# Patient Record
Sex: FEMALE | Race: WHITE | NOT HISPANIC OR LATINO | Employment: FULL TIME | URBAN - METROPOLITAN AREA
[De-identification: names, ages, dates, MRNs, and addresses within clinical notes are randomized per-mention and may not be internally consistent; named-entity substitution may affect disease eponyms.]

---

## 2019-04-24 ENCOUNTER — APPOINTMENT (EMERGENCY)
Dept: CT IMAGING | Facility: HOSPITAL | Age: 59
DRG: 440 | End: 2019-04-24
Payer: COMMERCIAL

## 2019-04-24 ENCOUNTER — HOSPITAL ENCOUNTER (INPATIENT)
Facility: HOSPITAL | Age: 59
LOS: 2 days | Discharge: HOME | DRG: 440 | End: 2019-04-26
Attending: EMERGENCY MEDICINE | Admitting: INTERNAL MEDICINE
Payer: COMMERCIAL

## 2019-04-24 DIAGNOSIS — K85.90 PANCREATITIS: Primary | ICD-10-CM

## 2019-04-24 PROBLEM — I10 ESSENTIAL HYPERTENSION: Status: ACTIVE | Noted: 2019-04-24

## 2019-04-24 LAB
ALBUMIN SERPL BCP-MCNC: 3.9 G/DL (ref 3.5–5)
ALP SERPL-CCNC: 67 U/L (ref 46–116)
ALT SERPL W P-5'-P-CCNC: 17 U/L (ref 12–78)
ANION GAP SERPL CALCULATED.3IONS-SCNC: 10 MMOL/L (ref 4–13)
AST SERPL W P-5'-P-CCNC: 20 U/L (ref 5–45)
BACTERIA UR QL AUTO: ABNORMAL /HPF
BASOPHILS # BLD AUTO: 0.03 THOUSANDS/ΜL (ref 0–0.1)
BASOPHILS NFR BLD AUTO: 1 % (ref 0–1)
BILIRUB SERPL-MCNC: 0.5 MG/DL (ref 0.2–1)
BILIRUB UR QL STRIP: NEGATIVE
BUN SERPL-MCNC: 13 MG/DL (ref 5–25)
CALCIUM SERPL-MCNC: 8.9 MG/DL (ref 8.3–10.1)
CHLORIDE SERPL-SCNC: 103 MMOL/L (ref 100–108)
CLARITY UR: CLEAR
CO2 SERPL-SCNC: 26 MMOL/L (ref 21–32)
COLOR UR: YELLOW
CREAT SERPL-MCNC: 0.73 MG/DL (ref 0.6–1.3)
EOSINOPHIL # BLD AUTO: 0.07 THOUSAND/ΜL (ref 0–0.61)
EOSINOPHIL NFR BLD AUTO: 2 % (ref 0–6)
ERYTHROCYTE [DISTWIDTH] IN BLOOD BY AUTOMATED COUNT: 12.8 % (ref 11.6–15.1)
GFR SERPL CREATININE-BSD FRML MDRD: 90 ML/MIN/1.73SQ M
GLUCOSE SERPL-MCNC: 87 MG/DL (ref 65–140)
GLUCOSE UR STRIP-MCNC: NEGATIVE MG/DL
HCT VFR BLD AUTO: 42.7 % (ref 34.8–46.1)
HGB BLD-MCNC: 14.1 G/DL (ref 11.5–15.4)
HGB UR QL STRIP.AUTO: ABNORMAL
IMM GRANULOCYTES # BLD AUTO: 0 THOUSAND/UL (ref 0–0.2)
IMM GRANULOCYTES NFR BLD AUTO: 0 % (ref 0–2)
KETONES UR STRIP-MCNC: NEGATIVE MG/DL
LEUKOCYTE ESTERASE UR QL STRIP: NEGATIVE
LIPASE SERPL-CCNC: 804 U/L (ref 73–393)
LYMPHOCYTES # BLD AUTO: 1.27 THOUSANDS/ΜL (ref 0.6–4.47)
LYMPHOCYTES NFR BLD AUTO: 27 % (ref 14–44)
MCH RBC QN AUTO: 29.3 PG (ref 26.8–34.3)
MCHC RBC AUTO-ENTMCNC: 33 G/DL (ref 31.4–37.4)
MCV RBC AUTO: 89 FL (ref 82–98)
MONOCYTES # BLD AUTO: 0.46 THOUSAND/ΜL (ref 0.17–1.22)
MONOCYTES NFR BLD AUTO: 10 % (ref 4–12)
NEUTROPHILS # BLD AUTO: 2.84 THOUSANDS/ΜL (ref 1.85–7.62)
NEUTS SEG NFR BLD AUTO: 60 % (ref 43–75)
NITRITE UR QL STRIP: NEGATIVE
NON-SQ EPI CELLS URNS QL MICRO: ABNORMAL /HPF
NRBC BLD AUTO-RTO: 0 /100 WBCS
PH UR STRIP.AUTO: 7 [PH] (ref 4.5–8)
PLATELET # BLD AUTO: 187 THOUSANDS/UL (ref 149–390)
PMV BLD AUTO: 9.4 FL (ref 8.9–12.7)
POTASSIUM SERPL-SCNC: 4.1 MMOL/L (ref 3.5–5.3)
PROT SERPL-MCNC: 7.4 G/DL (ref 6.4–8.2)
PROT UR STRIP-MCNC: NEGATIVE MG/DL
RBC # BLD AUTO: 4.81 MILLION/UL (ref 3.81–5.12)
RBC #/AREA URNS AUTO: ABNORMAL /HPF
SODIUM SERPL-SCNC: 139 MMOL/L (ref 136–145)
SP GR UR STRIP.AUTO: 1.02 (ref 1–1.03)
UROBILINOGEN UR QL STRIP.AUTO: 0.2 E.U./DL
WBC # BLD AUTO: 4.67 THOUSAND/UL (ref 4.31–10.16)
WBC #/AREA URNS AUTO: ABNORMAL /HPF

## 2019-04-24 PROCEDURE — 85025 COMPLETE CBC W/AUTO DIFF WBC: CPT | Performed by: EMERGENCY MEDICINE

## 2019-04-24 PROCEDURE — 83690 ASSAY OF LIPASE: CPT | Performed by: EMERGENCY MEDICINE

## 2019-04-24 PROCEDURE — 96374 THER/PROPH/DIAG INJ IV PUSH: CPT

## 2019-04-24 PROCEDURE — 96375 TX/PRO/DX INJ NEW DRUG ADDON: CPT

## 2019-04-24 PROCEDURE — 36415 COLL VENOUS BLD VENIPUNCTURE: CPT | Performed by: EMERGENCY MEDICINE

## 2019-04-24 PROCEDURE — 99285 EMERGENCY DEPT VISIT HI MDM: CPT

## 2019-04-24 PROCEDURE — 81001 URINALYSIS AUTO W/SCOPE: CPT

## 2019-04-24 PROCEDURE — 99284 EMERGENCY DEPT VISIT MOD MDM: CPT | Performed by: EMERGENCY MEDICINE

## 2019-04-24 PROCEDURE — 99223 1ST HOSP IP/OBS HIGH 75: CPT | Performed by: INTERNAL MEDICINE

## 2019-04-24 PROCEDURE — 74177 CT ABD & PELVIS W/CONTRAST: CPT

## 2019-04-24 PROCEDURE — 80053 COMPREHEN METABOLIC PANEL: CPT | Performed by: EMERGENCY MEDICINE

## 2019-04-24 RX ORDER — ASPIRIN 81 MG/1
81 TABLET ORAL DAILY
COMMUNITY

## 2019-04-24 RX ORDER — ASPIRIN 81 MG/1
81 TABLET ORAL DAILY
Status: DISCONTINUED | OUTPATIENT
Start: 2019-04-24 | End: 2019-04-24

## 2019-04-24 RX ORDER — HYDRALAZINE HYDROCHLORIDE 20 MG/ML
10 INJECTION INTRAMUSCULAR; INTRAVENOUS EVERY 6 HOURS PRN
Status: DISCONTINUED | OUTPATIENT
Start: 2019-04-24 | End: 2019-04-26 | Stop reason: HOSPADM

## 2019-04-24 RX ORDER — HYDROMORPHONE HCL/PF 1 MG/ML
0.5 SYRINGE (ML) INJECTION EVERY 4 HOURS PRN
Status: DISCONTINUED | OUTPATIENT
Start: 2019-04-24 | End: 2019-04-26 | Stop reason: HOSPADM

## 2019-04-24 RX ORDER — FENTANYL CITRATE 50 UG/ML
50 INJECTION, SOLUTION INTRAMUSCULAR; INTRAVENOUS ONCE
Status: COMPLETED | OUTPATIENT
Start: 2019-04-24 | End: 2019-04-24

## 2019-04-24 RX ORDER — SODIUM CHLORIDE 9 MG/ML
125 INJECTION, SOLUTION INTRAVENOUS CONTINUOUS
Status: CANCELLED | OUTPATIENT
Start: 2019-04-24

## 2019-04-24 RX ORDER — ONDANSETRON 2 MG/ML
4 INJECTION INTRAMUSCULAR; INTRAVENOUS EVERY 4 HOURS PRN
Status: DISCONTINUED | OUTPATIENT
Start: 2019-04-24 | End: 2019-04-26 | Stop reason: HOSPADM

## 2019-04-24 RX ORDER — AMOXICILLIN 250 MG
2 CAPSULE ORAL DAILY
Status: DISCONTINUED | OUTPATIENT
Start: 2019-04-24 | End: 2019-04-26 | Stop reason: HOSPADM

## 2019-04-24 RX ORDER — KETOROLAC TROMETHAMINE 30 MG/ML
15 INJECTION, SOLUTION INTRAMUSCULAR; INTRAVENOUS ONCE
Status: COMPLETED | OUTPATIENT
Start: 2019-04-24 | End: 2019-04-24

## 2019-04-24 RX ORDER — ASPIRIN 81 MG/1
81 TABLET ORAL DAILY
Status: DISCONTINUED | OUTPATIENT
Start: 2019-04-24 | End: 2019-04-26 | Stop reason: HOSPADM

## 2019-04-24 RX ORDER — METOPROLOL SUCCINATE 50 MG/1
50 TABLET, EXTENDED RELEASE ORAL DAILY
Status: DISCONTINUED | OUTPATIENT
Start: 2019-04-24 | End: 2019-04-26 | Stop reason: HOSPADM

## 2019-04-24 RX ORDER — ONDANSETRON 2 MG/ML
4 INJECTION INTRAMUSCULAR; INTRAVENOUS ONCE
Status: COMPLETED | OUTPATIENT
Start: 2019-04-24 | End: 2019-04-24

## 2019-04-24 RX ORDER — ACETAMINOPHEN 325 MG/1
650 TABLET ORAL EVERY 4 HOURS PRN
Status: DISCONTINUED | OUTPATIENT
Start: 2019-04-24 | End: 2019-04-24

## 2019-04-24 RX ORDER — METOPROLOL SUCCINATE 50 MG/1
50 TABLET, EXTENDED RELEASE ORAL DAILY
COMMUNITY

## 2019-04-24 RX ORDER — SODIUM CHLORIDE 9 MG/ML
100 INJECTION, SOLUTION INTRAVENOUS CONTINUOUS
Status: DISCONTINUED | OUTPATIENT
Start: 2019-04-24 | End: 2019-04-26 | Stop reason: HOSPADM

## 2019-04-24 RX ORDER — ACETAMINOPHEN 325 MG/1
650 TABLET ORAL EVERY 6 HOURS PRN
Status: DISCONTINUED | OUTPATIENT
Start: 2019-04-24 | End: 2019-04-26 | Stop reason: HOSPADM

## 2019-04-24 RX ORDER — OXYCODONE HYDROCHLORIDE 10 MG/1
10 TABLET ORAL EVERY 4 HOURS PRN
Status: DISCONTINUED | OUTPATIENT
Start: 2019-04-24 | End: 2019-04-26 | Stop reason: HOSPADM

## 2019-04-24 RX ORDER — OXYCODONE HYDROCHLORIDE 5 MG/1
5 TABLET ORAL EVERY 4 HOURS PRN
Status: DISCONTINUED | OUTPATIENT
Start: 2019-04-24 | End: 2019-04-26 | Stop reason: HOSPADM

## 2019-04-24 RX ADMIN — ASPIRIN 81 MG: 81 TABLET, COATED ORAL at 13:20

## 2019-04-24 RX ADMIN — KETOROLAC TROMETHAMINE 15 MG: 30 INJECTION, SOLUTION INTRAMUSCULAR at 07:30

## 2019-04-24 RX ADMIN — IOHEXOL 100 ML: 350 INJECTION, SOLUTION INTRAVENOUS at 08:22

## 2019-04-24 RX ADMIN — ACETAMINOPHEN 650 MG: 325 TABLET, FILM COATED ORAL at 15:54

## 2019-04-24 RX ADMIN — ONDANSETRON 4 MG: 2 INJECTION INTRAMUSCULAR; INTRAVENOUS at 07:31

## 2019-04-24 RX ADMIN — SENNOSIDES AND DOCUSATE SODIUM 2 TABLET: 8.6; 5 TABLET ORAL at 20:18

## 2019-04-24 RX ADMIN — SODIUM CHLORIDE 150 ML/HR: 0.9 INJECTION, SOLUTION INTRAVENOUS at 13:06

## 2019-04-24 RX ADMIN — SODIUM CHLORIDE 150 ML/HR: 0.9 INJECTION, SOLUTION INTRAVENOUS at 20:19

## 2019-04-24 RX ADMIN — METOPROLOL SUCCINATE 50 MG: 50 TABLET, EXTENDED RELEASE ORAL at 13:20

## 2019-04-24 RX ADMIN — FENTANYL CITRATE 50 MCG: 50 INJECTION, SOLUTION INTRAMUSCULAR; INTRAVENOUS at 09:44

## 2019-04-25 LAB
CHOLEST SERPL-MCNC: 121 MG/DL (ref 50–200)
HDLC SERPL-MCNC: 48 MG/DL (ref 40–60)
LDLC SERPL CALC-MCNC: 57 MG/DL (ref 0–100)
LIPASE SERPL-CCNC: 159 U/L (ref 73–393)
NONHDLC SERPL-MCNC: 73 MG/DL
TRIGL SERPL-MCNC: 78 MG/DL

## 2019-04-25 PROCEDURE — 83690 ASSAY OF LIPASE: CPT | Performed by: INTERNAL MEDICINE

## 2019-04-25 PROCEDURE — 80061 LIPID PANEL: CPT | Performed by: INTERNAL MEDICINE

## 2019-04-25 PROCEDURE — 99232 SBSQ HOSP IP/OBS MODERATE 35: CPT | Performed by: INTERNAL MEDICINE

## 2019-04-25 RX ORDER — KETOROLAC TROMETHAMINE 30 MG/ML
15 INJECTION, SOLUTION INTRAMUSCULAR; INTRAVENOUS ONCE
Status: DISCONTINUED | OUTPATIENT
Start: 2019-04-25 | End: 2019-04-26 | Stop reason: HOSPADM

## 2019-04-25 RX ADMIN — ACETAMINOPHEN 650 MG: 325 TABLET, FILM COATED ORAL at 02:02

## 2019-04-25 RX ADMIN — ACETAMINOPHEN 650 MG: 325 TABLET, FILM COATED ORAL at 12:11

## 2019-04-25 RX ADMIN — METOPROLOL SUCCINATE 50 MG: 50 TABLET, EXTENDED RELEASE ORAL at 12:11

## 2019-04-25 RX ADMIN — SODIUM CHLORIDE 100 ML/HR: 0.9 INJECTION, SOLUTION INTRAVENOUS at 17:01

## 2019-04-25 RX ADMIN — SODIUM CHLORIDE 150 ML/HR: 0.9 INJECTION, SOLUTION INTRAVENOUS at 03:13

## 2019-04-26 VITALS
SYSTOLIC BLOOD PRESSURE: 164 MMHG | DIASTOLIC BLOOD PRESSURE: 98 MMHG | RESPIRATION RATE: 18 BRPM | WEIGHT: 170.42 LBS | TEMPERATURE: 97.9 F | HEIGHT: 64 IN | HEART RATE: 62 BPM | BODY MASS INDEX: 29.09 KG/M2 | OXYGEN SATURATION: 98 %

## 2019-04-26 PROBLEM — K85.90 ACUTE PANCREATITIS: Status: RESOLVED | Noted: 2019-04-24 | Resolved: 2019-04-26

## 2019-04-26 PROCEDURE — 99239 HOSP IP/OBS DSCHRG MGMT >30: CPT | Performed by: INTERNAL MEDICINE

## 2019-04-26 RX ADMIN — SODIUM CHLORIDE 100 ML/HR: 0.9 INJECTION, SOLUTION INTRAVENOUS at 01:11

## 2019-04-26 RX ADMIN — SENNOSIDES AND DOCUSATE SODIUM 2 TABLET: 8.6; 5 TABLET ORAL at 09:31

## 2019-04-26 RX ADMIN — ASPIRIN 81 MG: 81 TABLET, COATED ORAL at 12:05

## 2019-04-26 RX ADMIN — METOPROLOL SUCCINATE 50 MG: 50 TABLET, EXTENDED RELEASE ORAL at 12:05

## 2021-02-16 ENCOUNTER — HOSPITAL ENCOUNTER (OUTPATIENT)
Facility: HOSPITAL | Age: 61
Setting detail: OBSERVATION
Discharge: HOME | End: 2021-02-17
Admitting: INTERNAL MEDICINE
Payer: COMMERCIAL

## 2021-02-16 ENCOUNTER — APPOINTMENT (EMERGENCY)
Dept: RADIOLOGY | Facility: HOSPITAL | Age: 61
End: 2021-02-16
Payer: COMMERCIAL

## 2021-02-16 ENCOUNTER — APPOINTMENT (EMERGENCY)
Dept: CT IMAGING | Facility: HOSPITAL | Age: 61
End: 2021-02-16
Payer: COMMERCIAL

## 2021-02-16 DIAGNOSIS — R55 TRANSIENT LOSS OF CONSCIOUSNESS: Primary | ICD-10-CM

## 2021-02-16 DIAGNOSIS — R41.82 ALTERED MENTAL STATUS, UNSPECIFIED ALTERED MENTAL STATUS TYPE: ICD-10-CM

## 2021-02-16 PROBLEM — I71.21 ASCENDING AORTIC ANEURYSM: Status: ACTIVE | Noted: 2021-02-16

## 2021-02-16 PROBLEM — E87.6 HYPOKALEMIA: Status: ACTIVE | Noted: 2021-02-16

## 2021-02-16 PROBLEM — I71.2 ASCENDING AORTIC ANEURYSM (HCC): Status: ACTIVE | Noted: 2021-02-16

## 2021-02-16 LAB
ALBUMIN SERPL BCP-MCNC: 4.2 G/DL (ref 3.4–4.8)
ALP SERPL-CCNC: 50.8 U/L (ref 35–140)
ALT SERPL W P-5'-P-CCNC: 9 U/L (ref 5–54)
ANION GAP SERPL CALCULATED.3IONS-SCNC: 9 MMOL/L (ref 4–13)
APTT PPP: 25 SECONDS (ref 23–31)
AST SERPL W P-5'-P-CCNC: 15 U/L (ref 15–41)
BASOPHILS # BLD AUTO: 0.02 THOUSANDS/ΜL (ref 0–0.1)
BASOPHILS NFR BLD AUTO: 0 % (ref 0–1)
BILIRUB SERPL-MCNC: 0.52 MG/DL (ref 0.3–1.2)
BUN SERPL-MCNC: 14 MG/DL (ref 6–20)
CALCIUM SERPL-MCNC: 8.8 MG/DL (ref 8.4–10.2)
CHLORIDE SERPL-SCNC: 103 MMOL/L (ref 96–108)
CO2 SERPL-SCNC: 23 MMOL/L (ref 22–33)
CREAT SERPL-MCNC: 0.67 MG/DL (ref 0.4–1.1)
D DIMER PPP FEU-MCNC: 0.46 MG/L FEU (ref 0.19–0.49)
EOSINOPHIL # BLD AUTO: 0.02 THOUSAND/ΜL (ref 0–0.61)
EOSINOPHIL NFR BLD AUTO: 0 % (ref 0–6)
ERYTHROCYTE [DISTWIDTH] IN BLOOD BY AUTOMATED COUNT: 13.4 % (ref 11.6–15.1)
GFR SERPL CREATININE-BSD FRML MDRD: 95 ML/MIN/1.73SQ M
GLUCOSE SERPL-MCNC: 102 MG/DL (ref 65–140)
HCT VFR BLD AUTO: 38.5 % (ref 34.8–46.1)
HGB BLD-MCNC: 13.1 G/DL (ref 11.5–15.4)
IMM GRANULOCYTES # BLD AUTO: 0.01 THOUSAND/UL (ref 0–0.2)
IMM GRANULOCYTES NFR BLD AUTO: 0 % (ref 0–2)
INR PPP: 0.97 (ref 0.9–1.1)
LYMPHOCYTES # BLD AUTO: 0.65 THOUSANDS/ΜL (ref 0.6–4.47)
LYMPHOCYTES NFR BLD AUTO: 10 % (ref 14–44)
MCH RBC QN AUTO: 28.7 PG (ref 26.8–34.3)
MCHC RBC AUTO-ENTMCNC: 34 G/DL (ref 31.4–37.4)
MCV RBC AUTO: 84 FL (ref 82–98)
MONOCYTES # BLD AUTO: 0.39 THOUSAND/ΜL (ref 0.17–1.22)
MONOCYTES NFR BLD AUTO: 6 % (ref 4–12)
NEUTROPHILS # BLD AUTO: 5.25 THOUSANDS/ΜL (ref 1.85–7.62)
NEUTS SEG NFR BLD AUTO: 84 % (ref 43–75)
PLATELET # BLD AUTO: 191 THOUSANDS/UL (ref 149–390)
PMV BLD AUTO: 10.1 FL (ref 8.9–12.7)
POTASSIUM SERPL-SCNC: 3.4 MMOL/L (ref 3.5–5)
PROT SERPL-MCNC: 6.7 G/DL (ref 6.4–8.3)
PROTHROMBIN TIME: 11 SECONDS (ref 9.5–12.1)
RBC # BLD AUTO: 4.56 MILLION/UL (ref 3.81–5.12)
SODIUM SERPL-SCNC: 135 MMOL/L (ref 133–145)
TROPONIN I SERPL-MCNC: <0.03 NG/ML (ref 0–0.07)
WBC # BLD AUTO: 6.34 THOUSAND/UL (ref 4.31–10.16)

## 2021-02-16 PROCEDURE — 84484 ASSAY OF TROPONIN QUANT: CPT

## 2021-02-16 PROCEDURE — 80053 COMPREHEN METABOLIC PANEL: CPT

## 2021-02-16 PROCEDURE — G1004 CDSM NDSC: HCPCS

## 2021-02-16 PROCEDURE — 71045 X-RAY EXAM CHEST 1 VIEW: CPT

## 2021-02-16 PROCEDURE — 85610 PROTHROMBIN TIME: CPT

## 2021-02-16 PROCEDURE — 70496 CT ANGIOGRAPHY HEAD: CPT

## 2021-02-16 PROCEDURE — 71275 CT ANGIOGRAPHY CHEST: CPT

## 2021-02-16 PROCEDURE — 96361 HYDRATE IV INFUSION ADD-ON: CPT

## 2021-02-16 PROCEDURE — 93005 ELECTROCARDIOGRAM TRACING: CPT

## 2021-02-16 PROCEDURE — 70498 CT ANGIOGRAPHY NECK: CPT

## 2021-02-16 PROCEDURE — 85379 FIBRIN DEGRADATION QUANT: CPT

## 2021-02-16 PROCEDURE — 96360 HYDRATION IV INFUSION INIT: CPT

## 2021-02-16 PROCEDURE — 36415 COLL VENOUS BLD VENIPUNCTURE: CPT

## 2021-02-16 PROCEDURE — 85730 THROMBOPLASTIN TIME PARTIAL: CPT

## 2021-02-16 PROCEDURE — 85025 COMPLETE CBC W/AUTO DIFF WBC: CPT

## 2021-02-16 PROCEDURE — 99285 EMERGENCY DEPT VISIT HI MDM: CPT

## 2021-02-16 PROCEDURE — 74174 CTA ABD&PLVS W/CONTRAST: CPT

## 2021-02-16 PROCEDURE — 99220 PR INITIAL OBSERVATION CARE/DAY 70 MINUTES: CPT | Performed by: INTERNAL MEDICINE

## 2021-02-16 RX ORDER — METOPROLOL SUCCINATE 50 MG/1
50 TABLET, EXTENDED RELEASE ORAL DAILY
Status: DISCONTINUED | OUTPATIENT
Start: 2021-02-17 | End: 2021-02-17 | Stop reason: HOSPADM

## 2021-02-16 RX ORDER — SENNOSIDES 8.6 MG
1 TABLET ORAL DAILY
Status: DISCONTINUED | OUTPATIENT
Start: 2021-02-17 | End: 2021-02-17 | Stop reason: HOSPADM

## 2021-02-16 RX ORDER — SODIUM CHLORIDE 9 MG/ML
500 INJECTION, SOLUTION INTRAVENOUS CONTINUOUS
Status: DISCONTINUED | OUTPATIENT
Start: 2021-02-16 | End: 2021-02-16

## 2021-02-16 RX ADMIN — ALUMINA, MAGNESIA, AND SIMETHICONE ORAL SUSPENSION REGULAR STRENGTH 10 ML: 1200; 1200; 120 SUSPENSION ORAL at 20:57

## 2021-02-16 RX ADMIN — SODIUM CHLORIDE 500 ML/HR: 0.9 INJECTION, SOLUTION INTRAVENOUS at 14:21

## 2021-02-16 RX ADMIN — IOHEXOL 85 ML: 350 INJECTION, SOLUTION INTRAVENOUS at 15:03

## 2021-02-16 NOTE — ED NOTES
Patient ambulatory to bathroom with steady gait  Reports mild dizziness, but states she is "starting to feel better"        Crhis Mcneill RN  02/16/21 [id number]

## 2021-02-16 NOTE — H&P
INTERNAL MEDICINE RESIDENCY ADMISSION H&P     Name: Nitish Espinal   Age & Sex: 63 y o  female   MRN: [id number]  Unit/Bed#: -01   Encounter: [id number]  Primary Care Provider: Caitlin Ramirez DO    Code Status: Level 1 - Full Code  Admission Status: OBSERVATION  Disposition: Patient requires Med/Surg with Telemetry    Admit to team: SLIM    ASSESSMENT/PLAN     Active Problems:    Transient loss of consciousness    Essential hypertension    Hypokalemia    Ascending aortic aneurysm (HCC)      Transient loss of consciousness  Assessment & Plan  Unclear etiology  Reports to have lost recollection of events that happened post shoveling snow  Denies biting of the tongue, loss of continence, or palpitation  Reports history of similar events in the past that have not been worked up for   - Will order neuro checks  - will monitor on telemetry  - Orthostatic vitals  - Neurology consulted  Appreciate input    Ascending aortic aneurysm [facility])  Assessment & Plan  CTA study revealed aneurysmal dilatation of the ascending aorta measuring 4 6 cm in transverse diameter  Will benefit from follow up with vascular surgery as OP in the next 6 months to assess stability  Hypokalemia  Assessment & Plan  Hypokalemia of 3 4  Will replace accordingly    Essential hypertension  Assessment & Plan  Presented with elevated blood pressure 162/101 mmHg  On metoprolol 50 mg  Continue home medication  VTE Pharmacologic Prophylaxis: Enoxaparin (Lovenox)  VTE Mechanical Prophylaxis: sequential compression device    CHIEF COMPLAINT     Chief Complaint   Patient presents with    Altered Mental Status     period of disorientation this morning after shoveling snow, states "I felt funny and knew to come in the house", unable to recall events of last 3 hrs, awoke on her couch with belongings askew around the house         HISTORY OF PRESENT ILLNESS     Renata Ibarra is a 64 yo F with PMHx significant for HTN, GERD and diverticulitis who presents following a questionable transient loss of consciousness  Patient lives at home by herself  Said to have shoveled snow started to feel funny while shoveling snow and felt she needed to get inside  Patient has no recollection of what happened for approximately an hour to after that  Patient states she saw with 1 of her boots on the floor and 1 area and the other another area and she was on her couch  She has no recollection of how that happened  She woke up with nausea denied any bladder or bowel incontinence  Did not bite her tongue patient denies any headache but does have ongoing nausea at this point  She is awake alert and oriented x3 very interactive  She just states she can not focus  Patient denies any weakness of her arms or legs she does not demonstrate any facial droop  Patient states she has had a previos episode similar to this in the past but she has never been worked up for them  CT angiography showed 4 6 aneurysmal dilatation of the ascending aorta  CTA dissection protocol of chest, abdomen and pelvis unremarkable  Blood work was significant for hypokalemia of 3 4,     REVIEW OF SYSTEMS     Review of Systems   Constitutional: Negative for fatigue and fever  HENT: Negative  Eyes: Negative  Respiratory: Negative  Cardiovascular: Negative  Gastrointestinal: Negative  Genitourinary: Negative  Neurological: Positive for light-headedness  Negative for dizziness, tremors, seizures and weakness  All other systems reviewed and are negative      OBJECTIVE     Vitals:    21 1405   BP: (!) 162/101   Pulse: 56   Resp: 22   Temp: 97 5 °F (36 4 °C)   TempSrc: Oral   SpO2: 99%   Weight: 83 8 kg (184 lb 11 9 oz)   Height: 5' 4" (1 626 m)      Temperature:   Temp (24hrs), Av 5 °F (36 4 °C), Min:97 5 °F (36 4 °C), Max:97 5 °F (36 4 °C)    Temperature: 97 5 °F (36 4 °C)  Intake & Output:  I/O     None        Weights:   IBW: 54 7 kg    Body mass index is 31 71 kg/m²  Weight (last 2 days)     Date/Time   Weight    02/16/21 1405   83 8 (184 75)            Physical Exam  Vitals signs reviewed  Constitutional:       General: She is not in acute distress  Appearance: She is not toxic-appearing  HENT:      Head: Normocephalic and atraumatic  Neck:      Musculoskeletal: No neck rigidity or muscular tenderness  Cardiovascular:      Rate and Rhythm: Normal rate and regular rhythm  Pulses: Normal pulses  Pulmonary:      Effort: No respiratory distress  Breath sounds: Normal breath sounds  No wheezing  Abdominal:      General: Bowel sounds are normal       Palpations: Abdomen is soft  Musculoskeletal:      Right lower leg: No edema  Left lower leg: No edema  Neurological:      General: No focal deficit present  Mental Status: She is alert and oriented to person, place, and time  Mental status is at baseline  Motor: No weakness  PAST MEDICAL HISTORY     Past Medical History:   Diagnosis Date    Diverticulitis     GERD (gastroesophageal reflux disease)     Hypertension      PAST SURGICAL HISTORY     Past Surgical History:   Procedure Laterality Date    ABDOMINAL SURGERY      CHOLECYSTECTOMY      HYSTERECTOMY       SOCIAL & FAMILY HISTORY     Social History     Substance and Sexual Activity   Alcohol Use Yes    Alcohol/week: 1 0 standard drinks    Types: 1 Glasses of wine per week    Frequency: 2-4 times a month     Substance and Sexual Activity   Alcohol Use Yes    Alcohol/week: 1 0 standard drinks    Types: 1 Glasses of wine per week    Frequency: 2-4 times a month        Substance and Sexual Activity   Drug Use Yes    Types: Marijuana    Comment: medical marijuana card     Social History     Tobacco Use   Smoking Status Never Smoker   Smokeless Tobacco Never Used     Family History   Problem Relation Age of Onset    Hypertension Mother     Hypertension Father      LABORATORY DATA     Labs:  I have personally reviewed pertinent reports  Results from last 7 days   Lab Units 02/16/21  1411   WBC Thousand/uL 6 34   HEMOGLOBIN g/dL 13 1   HEMATOCRIT % 38 5   PLATELETS Thousands/uL 191   NEUTROS PCT % 84*   MONOS PCT % 6      Results from last 7 days   Lab Units 02/16/21  1411   POTASSIUM mmol/L 3 4*   CHLORIDE mmol/L 103   CO2 mmol/L 23   BUN mg/dL 14   CREATININE mg/dL 0 67   CALCIUM mg/dL 8 8   ALK PHOS U/L 50 8   ALT U/L 9   AST U/L 15              Results from last 7 days   Lab Units 02/16/21  1411   INR  0 97   PTT seconds 25         Results from last 7 days   Lab Units 02/16/21  1411   TROPONIN I ng/mL <0 03     Micro:  No results found for: Shira Backer, WOUNDCULT, SPUTUMCULTUR  IMAGING & DIAGNOSTIC TESTS     Imaging: I have personally reviewed pertinent reports  Cta Head And Neck With And Without Contrast    Result Date: 2/16/2021  Impression: CT brain: Stable examination with no acute intracranial abnormality  CT angiography: Aneurysmal dilatation of the ascending aorta measuring 4 6 cm in diameter  No stenosis, occlusion or thrombosis of the cervical or intracranial vasculature  Workstation performed: [id number]     Xr Chest 1 View Portable    Result Date: 2/16/2021  Impression: No acute cardiopulmonary disease  Workstation performed: [id number]     Cta Dissection Protocol Chest/abdomen/pelvis    Result Date: 2/16/2021  Impression: 1  Ascending thoracic aortic aneurysm measuring up to 4 6 cm  No dissection  2   Sigmoid diverticular disease without diverticulitis  3   Moderate constipation  Workstation performed: [id number]     EKG, Pathology, and Other Studies: I have personally reviewed pertinent reports  ALLERGIES   No Known Allergies  MEDICATIONS PRIOR TO ARRIVAL     Prior to Admission medications    Medication Sig Start Date End Date Taking?  Authorizing Provider   aspirin (ECOTRIN LOW STRENGTH) 81 mg EC tablet Take 81 mg by mouth daily Every other day   Yes Historical Provider, MD   metoprolol succinate (TOPROL-XL) 50 mg 24 hr tablet Take 50 mg by mouth daily   Yes Historical Provider, MD     MEDICATIONS ADMINISTERED IN LAST 24 HOURS     Medication Administration - last 24 hours from 02/15/2021 1804 to 02/16/2021 1804       Date/Time Order Dose Route Action Action by     02/16/2021 1421 sodium chloride 0 9 % infusion 500 mL/hr Intravenous 300 [facility] Tabitha Bolaños RN     02/16/2021 1503 iohexol (OMNIPAQUE) 350 MG/ML injection (MULTI-DOSE) 100 mL 85 mL Intravenous Given [employer]age        CURRENT MEDICATIONS     Current Facility-Administered Medications   Medication Dose Route Frequency Provider Last Rate    [START ON 2/17/2021] enoxaparin  40 mg Subcutaneous Daily Fernando Bertrand MD      [START ON 2/17/2021] metoprolol succinate  50 mg Oral Daily Fernando Bertrand MD      [START ON 2/17/2021] senna  1 tablet Oral Daily Fernando Bertrand MD               Admission Time  I spent 45 minutes admitting the patient  This involved direct patient contact where I performed a full history and physical, reviewing previous records, and reviewing laboratory and other diagnostic studies  Portions of the record may have been created with voice recognition software  Occasional wrong word or "sound a like" substitutions may have occurred due to the inherent limitations of voice recognition software    Read the chart carefully and recognize, using context, where substitutions have occurred     ==  Fernando Bertrand MD  [address]  Internal Medicine Residency PGY-1

## 2021-02-16 NOTE — ASSESSMENT & PLAN NOTE
CTA study revealed aneurysmal dilatation of the ascending aorta measuring 4 6 cm in transverse diameter  Will benefit from follow up with vascular surgery as OP in the next 6 months to assess stability

## 2021-02-16 NOTE — ASSESSMENT & PLAN NOTE
Unclear etiology  Reports to have lost recollection of events that happened post shoveling snow  Denies biting of the tongue, loss of continence, or palpitation  Reports history of similar events in the past that have not been worked up for   - Will order neuro checks  - will monitor on telemetry  - Orthostatic vitals  - Neurology consulted   Appreciate input

## 2021-02-16 NOTE — ED PROVIDER NOTES
History  Chief Complaint   Patient presents with    Altered Mental Status     period of disorientation this morning after shoveling snow, states "I felt funny and knew to come in the house", unable to recall events of last 3 hrs, awoke on her couch with belongings jorjeew around the house  70-year-old female history of hypertension on metoprolol presents secondary to in puree id of time this morning where she has no recollection of events  Patient lives at home by herself  Patient states that she was working from home lock down on her computer when outside and was shoveling snow started to feel funny while shoveling snow and felt she needed to get inside patient has no recollection of what happened for approximately an hour to after that  Patient states she saw with 1 of her boots on the floor and 1 area and then another area her coat and another area and she was on her couch  She has no recollection of how that happened  She woke up with nausea denied any loss of control of bladder did not bite her tongue patient denies any headache but does have ongoing nausea at this point  She is awake alert and oriented x3 very interactive  She just states she can not focus  Patient denies any weakness of her arms or legs she does not demonstrate any facial droop  Patient states she has had multiple episodes similar to this in the past but she has never been worked up for them  Prior to Admission Medications   Prescriptions Last Dose Informant Patient Reported?  Taking?   aspirin (ECOTRIN LOW STRENGTH) 81 mg EC tablet 2/15/2021 at 1200  Yes Yes   Sig: Take 81 mg by mouth daily Every other day   metoprolol succinate (TOPROL-XL) 50 mg 24 hr tablet 2/16/2021 at 1200  Yes Yes   Sig: Take 50 mg by mouth daily      Facility-Administered Medications: None       Past Medical History:   Diagnosis Date    Diverticulitis     GERD (gastroesophageal reflux disease)     Hypertension        Past Surgical History: Procedure Laterality Date    ABDOMINAL SURGERY      CHOLECYSTECTOMY      HYSTERECTOMY         Family History   Problem Relation Age of Onset    Hypertension Mother     Hypertension Father      I have reviewed and agree with the history as documented  E-Cigarette/Vaping    E-Cigarette Use Never User      E-Cigarette/Vaping Substances     Social History     Tobacco Use    Smoking status: Never Smoker    Smokeless tobacco: Never Used   Substance Use Topics    Alcohol use: Yes     Alcohol/week: 1 0 standard drinks     Types: 1 Glasses of wine per week     Frequency: 2-4 times a month    Drug use: Yes     Types: Marijuana     Comment: medical marijuana card       Review of Systems   Constitutional: Negative for chills and fever  HENT: Negative for congestion  Eyes: Negative for visual disturbance  Respiratory: Negative for shortness of breath  Cardiovascular: Negative for chest pain  Gastrointestinal: Positive for nausea  Negative for abdominal pain  Endocrine: Negative for cold intolerance  Genitourinary: Negative for frequency  Musculoskeletal: Negative for gait problem  Skin: Negative for rash  Neurological: Negative for dizziness  Psychiatric/Behavioral: Positive for confusion  Negative for behavioral problems  Physical Exam  Physical Exam  Vitals signs and nursing note reviewed  Constitutional:       Appearance: She is well-developed  HENT:      Head: Normocephalic and atraumatic  Eyes:      General: No visual field deficit  Conjunctiva/sclera: Conjunctivae normal       Pupils: Pupils are equal, round, and reactive to light  Neck:      Musculoskeletal: Normal range of motion and neck supple  Cardiovascular:      Rate and Rhythm: Normal rate and regular rhythm  Heart sounds: Normal heart sounds  Pulmonary:      Effort: Pulmonary effort is normal       Breath sounds: Normal breath sounds     Abdominal:      General: Bowel sounds are normal  Palpations: Abdomen is soft  Musculoskeletal: Normal range of motion  Skin:     General: Skin is warm and dry  Capillary Refill: Capillary refill takes less than 2 seconds  Neurological:      Mental Status: She is alert and oriented to person, place, and time  Cranial Nerves: No cranial nerve deficit, dysarthria or facial asymmetry  Sensory: No sensory deficit  Motor: No weakness  Coordination: Coordination normal       Deep Tendon Reflexes: Reflexes normal  Babinski sign absent on the right side  Babinski sign absent on the left side     Psychiatric:         Behavior: Behavior normal          Vital Signs  ED Triage Vitals [02/16/21 1405]   Temperature Pulse Respirations Blood Pressure SpO2   97 5 °F (36 4 °C) 56 22 (!) 162/101 99 %      Temp Source Heart Rate Source Patient Position - Orthostatic VS BP Location FiO2 (%)   Oral -- -- -- --      Pain Score       --           Vitals:    02/16/21 1405   BP: (!) 162/101   Pulse: 56         Visual Acuity      ED Medications  Medications   sodium chloride 0 9 % infusion (500 mL/hr Intravenous New Bag 2/16/21 1421)   iohexol (OMNIPAQUE) 350 MG/ML injection (SINGLE-DOSE) 100 mL (has no administration in time range)   iohexol (OMNIPAQUE) 350 MG/ML injection (MULTI-DOSE) 100 mL (85 mL Intravenous Given 2/16/21 1503)       Diagnostic Studies  Results Reviewed     Procedure Component Value Units Date/Time    Troponin I [[id number]]  (Normal) Collected: 02/16/21 1411    Lab Status: Final result Specimen: Blood from Arm, Right Updated: 02/16/21 1456     Troponin I <0 03 ng/mL     D-dimer, quantitative [[id number]]  (Normal) Collected: 02/16/21 1411    Lab Status: Final result Specimen: Blood from Arm, Right Updated: 02/16/21 1455     D-Dimer, Quant  0 46 mg/L FEU     Protime-INR [[id number]]  (Normal) Collected: 02/16/21 1411    Lab Status: Final result Specimen: Blood from Arm, Right Updated: 02/16/21 1455     Protime 11 0 seconds      INR 0 97 Narrative:      INR Reference Ranges:  No Anticoagulant, Normal:           0 9-1 1  Standard Dose, Oral Anticoagulant:  2 0-3 0  High Dose, Oral Anticoagulant:      2 5-3 5    APTT [[id number]]  (Normal) Collected: 02/16/21 1411    Lab Status: Final result Specimen: Blood from Arm, Right Updated: 02/16/21 1455     PTT 25 seconds     Comprehensive metabolic panel [[id number]]  (Abnormal) Collected: 02/16/21 1411    Lab Status: Final result Specimen: Blood from Arm, Right Updated: 02/16/21 1453     Sodium 135 mmol/L      Potassium 3 4 mmol/L      Chloride 103 mmol/L      CO2 23 mmol/L      ANION GAP 9 mmol/L      BUN 14 mg/dL      Creatinine 0 67 mg/dL      Glucose 102 mg/dL      Calcium 8 8 mg/dL      AST 15 U/L      ALT 9 U/L      Alkaline Phosphatase 50 8 U/L      Total Protein 6 7 g/dL      Albumin 4 2 g/dL      Total Bilirubin 0 52 mg/dL      eGFR 95 ml/min/1 73sq m     Narrative:      National Kidney Disease Foundation guidelines for Chronic Kidney Disease (CKD):     Stage 1 with normal or high GFR (GFR > 90 mL/min/1 73 square meters)    Stage 2 Mild CKD (GFR = 60-89 mL/min/1 73 square meters)    Stage 3A Moderate CKD (GFR = 45-59 mL/min/1 73 square meters)    Stage 3B Moderate CKD (GFR = 30-44 mL/min/1 73 square meters)    Stage 4 Severe CKD (GFR = 15-29 mL/min/1 73 square meters)    Stage 5 End Stage CKD (GFR <15 mL/min/1 73 square meters)  Note: GFR calculation is accurate only with a steady state creatinine    CBC and differential [[id number]]  (Abnormal) Collected: 02/16/21 1411    Lab Status: Final result Specimen: Blood from Arm, Right Updated: 02/16/21 1437     WBC 6 34 Thousand/uL      RBC 4 56 Million/uL      Hemoglobin 13 1 g/dL      Hematocrit 38 5 %      MCV 84 fL      MCH 28 7 pg      MCHC 34 0 g/dL      RDW 13 4 %      MPV 10 1 fL      Platelets 280 Thousands/uL      Neutrophils Relative 84 %      Immat GRANS % 0 %      Lymphocytes Relative 10 %      Monocytes Relative 6 %      Eosinophils Relative 0 %      Basophils Relative 0 %      Neutrophils Absolute 5 25 Thousands/µL      Immature Grans Absolute 0 01 Thousand/uL      Lymphocytes Absolute 0 65 Thousands/µL      Monocytes Absolute 0 39 Thousand/µL      Eosinophils Absolute 0 02 Thousand/µL      Basophils Absolute 0 02 Thousands/µL                  CTA head and neck with and without contrast   Final Result by Chanda Milligan DO (02/16 1535)      CT brain: Stable examination with no acute intracranial abnormality  CT angiography: Aneurysmal dilatation of the ascending aorta measuring 4 6 cm in diameter  No stenosis, occlusion or thrombosis of the cervical or intracranial vasculature  Workstation performed: [id number]         XR chest 1 view portable   ED Interpretation by Tadeo Paez MD (02/16 6688)   CXR:  No acute findings  CTA dissection protocol chest/abdomen/pelvis    (Results Pending)              Procedures  ECG 12 Lead Documentation Only    Date/Time: 2/16/2021 4:17 PM  Performed by: Tadeo Paez MD  Authorized by: Tadeo Paez MD     Indications / Diagnosis:  Syncope  Comments:      EKG demonstrates sinus bradycardia no acute ST T wave changes normal axis             ED Course                 Stroke Assessment     Row Name 02/16/21 1414             NIH Stroke Scale    Interval  Baseline      Level of Consciousness (1a )  0      LOC Questions (1b )  0      LOC Commands (1c )  0      Best Gaze (2 )  0      Visual (3 )  0      Facial Palsy (4 )  0      Motor Arm, Left (5a )  0      Motor Arm, Right (5b )  0      Motor Leg, Left (6a )  0      Motor Leg, Right (6b )  0      Limb Ataxia (7 )  0      Sensory (8 )  0      Best Language (9 )  0      Dysarthria (10 )  0      Extinction and Inattention (11 ) (Formerly Neglect)  0      Total  0          First Filed Value   TPA Decision  Patient not a TPA candidate  Patient is not a candidate options  Symptoms resolved/clearly non disabling  SBIRT 20yo+      Most Recent Value   SBIRT (24 yo +)   In order to provide better care to our patients, we are screening all of our patients for alcohol and drug use  Would it be okay to ask you these screening questions? Yes Filed at: 02/16/2021 1429   Initial Alcohol Screen: US AUDIT-C    1  How often do you have a drink containing alcohol?  0 Filed at: 02/16/2021 1429   2  How many drinks containing alcohol do you have on a typical day you are drinking? 0 Filed at: 02/16/2021 1429   3a  Male UNDER 65: How often do you have five or more drinks on one occasion? 0 Filed at: 02/16/2021 1429   3b  FEMALE Any Age, or MALE 65+: How often do you have 4 or more drinks on one occassion? 0 Filed at: 02/16/2021 1429   Audit-C Score  0 Filed at: 02/16/2021 1429   KEELEY: How many times in the past year have you    Used an illegal drug or used a prescription medication for non-medical reasons? Never Filed at: 02/16/2021 1429                    MDM    Disposition  Final diagnoses:   None     ED Disposition     ED Disposition Condition Date/Time Comment    Admit Stable Tue Feb 16, 2021  4:15 PM Case was discussed with Hospitalist and the patient's admission status was agreed to be Admission Status: observation status to the service of Dr Rakesh Nolen     Follow-up Information    None         Patient's Medications   Discharge Prescriptions    No medications on file     No discharge procedures on file      PDMP Review     None          ED Provider  Electronically Signed by           Manish Velez MD  02/16/21 8840       Manish Velez MD  02/16/21 2517

## 2021-02-17 VITALS
HEART RATE: 54 BPM | SYSTOLIC BLOOD PRESSURE: 146 MMHG | OXYGEN SATURATION: 97 % | WEIGHT: 166.23 LBS | BODY MASS INDEX: 27.7 KG/M2 | DIASTOLIC BLOOD PRESSURE: 87 MMHG | TEMPERATURE: 97.4 F | RESPIRATION RATE: 16 BRPM | HEIGHT: 65 IN

## 2021-02-17 PROBLEM — K57.90 DIVERTICULOSIS: Status: ACTIVE | Noted: 2021-02-17

## 2021-02-17 PROBLEM — E87.6 HYPOKALEMIA: Status: RESOLVED | Noted: 2021-02-16 | Resolved: 2021-02-17

## 2021-02-17 LAB
ANION GAP SERPL CALCULATED.3IONS-SCNC: 8 MMOL/L (ref 4–13)
BASOPHILS # BLD AUTO: 0.01 THOUSANDS/ΜL (ref 0–0.1)
BASOPHILS NFR BLD AUTO: 0 % (ref 0–1)
BUN SERPL-MCNC: 9 MG/DL (ref 6–20)
CALCIUM SERPL-MCNC: 8.7 MG/DL (ref 8.4–10.2)
CHLORIDE SERPL-SCNC: 106 MMOL/L (ref 96–108)
CO2 SERPL-SCNC: 27 MMOL/L (ref 22–33)
CREAT SERPL-MCNC: 0.67 MG/DL (ref 0.4–1.1)
EOSINOPHIL # BLD AUTO: 0.08 THOUSAND/ΜL (ref 0–0.61)
EOSINOPHIL NFR BLD AUTO: 2 % (ref 0–6)
ERYTHROCYTE [DISTWIDTH] IN BLOOD BY AUTOMATED COUNT: 13.7 % (ref 11.6–15.1)
GFR SERPL CREATININE-BSD FRML MDRD: 95 ML/MIN/1.73SQ M
GLUCOSE P FAST SERPL-MCNC: 90 MG/DL (ref 70–105)
GLUCOSE SERPL-MCNC: 90 MG/DL (ref 65–140)
HCT VFR BLD AUTO: 39.5 % (ref 34.8–46.1)
HGB BLD-MCNC: 13 G/DL (ref 11.5–15.4)
IMM GRANULOCYTES # BLD AUTO: 0 THOUSAND/UL (ref 0–0.2)
IMM GRANULOCYTES NFR BLD AUTO: 0 % (ref 0–2)
LYMPHOCYTES # BLD AUTO: 1.31 THOUSANDS/ΜL (ref 0.6–4.47)
LYMPHOCYTES NFR BLD AUTO: 28 % (ref 14–44)
MCH RBC QN AUTO: 28.3 PG (ref 26.8–34.3)
MCHC RBC AUTO-ENTMCNC: 32.9 G/DL (ref 31.4–37.4)
MCV RBC AUTO: 86 FL (ref 82–98)
MONOCYTES # BLD AUTO: 0.39 THOUSAND/ΜL (ref 0.17–1.22)
MONOCYTES NFR BLD AUTO: 8 % (ref 4–12)
NEUTROPHILS # BLD AUTO: 2.89 THOUSANDS/ΜL (ref 1.85–7.62)
NEUTS SEG NFR BLD AUTO: 62 % (ref 43–75)
PLATELET # BLD AUTO: 210 THOUSANDS/UL (ref 149–390)
PMV BLD AUTO: 9.9 FL (ref 8.9–12.7)
POTASSIUM SERPL-SCNC: 3.5 MMOL/L (ref 3.5–5)
RBC # BLD AUTO: 4.59 MILLION/UL (ref 3.81–5.12)
SODIUM SERPL-SCNC: 141 MMOL/L (ref 133–145)
TSH SERPL DL<=0.05 MIU/L-ACNC: 1.71 UIU/ML (ref 0.34–5.6)
WBC # BLD AUTO: 4.68 THOUSAND/UL (ref 4.31–10.16)

## 2021-02-17 PROCEDURE — 85025 COMPLETE CBC W/AUTO DIFF WBC: CPT | Performed by: INTERNAL MEDICINE

## 2021-02-17 PROCEDURE — 84443 ASSAY THYROID STIM HORMONE: CPT | Performed by: INTERNAL MEDICINE

## 2021-02-17 PROCEDURE — 99217 PR OBSERVATION CARE DISCHARGE MANAGEMENT: CPT | Performed by: INTERNAL MEDICINE

## 2021-02-17 PROCEDURE — 80048 BASIC METABOLIC PNL TOTAL CA: CPT | Performed by: INTERNAL MEDICINE

## 2021-02-17 PROCEDURE — G0508 CRIT CARE TELEHEA CONSULT 60: HCPCS | Performed by: PSYCHIATRY & NEUROLOGY

## 2021-02-17 RX ORDER — ACETAMINOPHEN 325 MG/1
650 TABLET ORAL EVERY 6 HOURS PRN
Status: DISCONTINUED | OUTPATIENT
Start: 2021-02-17 | End: 2021-02-17 | Stop reason: HOSPADM

## 2021-02-17 RX ORDER — POTASSIUM CHLORIDE 20 MEQ/1
40 TABLET, EXTENDED RELEASE ORAL ONCE
Status: COMPLETED | OUTPATIENT
Start: 2021-02-17 | End: 2021-02-17

## 2021-02-17 RX ORDER — ASCORBIC ACID 500 MG
500 TABLET ORAL DAILY
Status: DISCONTINUED | OUTPATIENT
Start: 2021-02-17 | End: 2021-02-17 | Stop reason: HOSPADM

## 2021-02-17 RX ADMIN — ACETAMINOPHEN 650 MG: 325 TABLET ORAL at 06:16

## 2021-02-17 RX ADMIN — METOPROLOL SUCCINATE 50 MG: 50 TABLET, EXTENDED RELEASE ORAL at 09:04

## 2021-02-17 RX ADMIN — ENOXAPARIN SODIUM 40 MG: 40 INJECTION SUBCUTANEOUS at 09:05

## 2021-02-17 RX ADMIN — OXYCODONE HYDROCHLORIDE AND ACETAMINOPHEN 500 MG: 500 TABLET ORAL at 09:13

## 2021-02-17 RX ADMIN — POTASSIUM CHLORIDE 40 MEQ: 1500 TABLET, EXTENDED RELEASE ORAL at 09:13

## 2021-02-17 NOTE — UTILIZATION REVIEW
Initial Clinical Review    Admission: Date/Time/Statement:   Admission Orders (From admission, onward)     Ordered        02/16/21 1616  Place in Observation  Once                Orders Placed This Encounter   Procedures    Place in Observation     Standing Status:   Standing     Number of Occurrences:   1     Order Specific Question:   Level of Care     Answer:   Med Surg [16]     ED Arrival Information     Expected Arrival Acuity Means of Arrival Escorted By Service Admission Type    2/16/2021 13:52 2/16/2021 13:57 [facility] Hospitalist Urgent    Arrival Complaint    altered mental status     Chief Complaint   Patient presents with    Altered Mental Status     period of disorientation this morning after shoveling snow, states "I felt funny and knew to come in the house", unable to recall events of last 3 hrs, awoke on her couch with belongings askew around the house  Assessment/Plan:   63year old female with PMHx HTN, GERD and diverticulitis  Presented via EMS to [facility] ED on  2/16/21 following a questionable transient loss of consciousness  Reported she "started to feel funny funny while shoveling snow and needed to get inside"  She has no recall as to  what happened for approximately an hour to after that  Reports seeing 1 of her boots on the floor in 1 area and the other in another area when she was on her couch  She woke up with nausea  States she has had prior similar episodes in the past but was never  worked up for it  In ED - alert and oriented x 3, + nausea - reports  she can not focus  /101  HR 56   CT showed Ascending thoracic aortic aneurysm measuring up to 4 6 cm without dissection  Labs:  K+ 3 4   ED Tx: IVF NSS X 500 cc     Placed in Observation 2/16/21 at 1616 2nd Transient loss of consciousness with unclear etiology;  Hypokalemia - Neurology Consulted    2/17/21 Neurology Consult:  Assessment/Plan    This is a 64 y/o F who is admitted to the hospital with episodes of disorientation lasting for 2-3 hours, and had similar episode 1 year ago and another one 2 years ago  She does not recall what happens for part of the episode  I am concerned about seizure at this point  I personally reviewed her CTA head and neck which was negative for any LVO/stenosis      PLAN:   -recommend routine EEG  -hold off on treating these yet until we have more clarification regarding the episode  -Obtain MRI brain with and without contrast for structural abnormalities  -spoke to patient about "no driving from this point on until she hears from [facility] dot"  -will fill out MARIAJOSE dot form  ED Triage Vitals   Temperature Pulse Respirations Blood Pressure SpO2   02/16/21 1405 02/16/21 1405 02/16/21 1405 02/16/21 1405 02/16/21 1405   97 5 °F (36 4 °C) 56 22 (!) 162/101 99 %      Temp Source Heart Rate Source Patient Position - Orthostatic VS BP Location FiO2 (%)   02/16/21 1405 02/16/21 2000 02/16/21 1827 02/16/21 1827 --   Oral Monitor Lying Right arm       Wt Readings from Last 1 Encounters:   02/16/21 75 4 kg (166 lb 3 6 oz)     Additional Vital Signs:  02/17/21 0645  98 1 °F (36 7 °C)  62  18  134/94  --  98 %  --  Lying   02/17/21 0526  97 9 °F (36 6 °C)  61  16  145/88  108  98 %  None (Room air)  Lying   02/17/21 0000  98 5 °F (36 9 °C)  70  18  124/71  91  97 %  None (Room air)  Lying   02/16/21 2006  --  66  --  142/112Abnormal   123  --  --  Standing - Orthostatic VS   02/16/21 2003  --  67  --  134/91  116  --  --  Sitting - Orthostatic VS   02/16/21 2000  99 1 °F (37 3 °C)  64  20  114/78  96  98 %  None (Room air)  Lying - Orthostatic VS   02/16/21 1827  97 7 °F (36 5 °C)  63  20  159/100   --  98 %  --  Lying   BP: nurse aware at 02/16/21 1827 02/17 0701 02/18 0700   P  O  340   Total Intake(mL/kg) 340 (4 5)   Net +340       Unmeasured Urine Occurrence 3 x     Pertinent Labs/Diagnostic Test Results:     Results from last 7 days   Lab Units 02/17/21  0916 02/16/21  1411   WBC Thousand/uL 4 68 6 34   HEMOGLOBIN g/dL 13 0 13 1   HEMATOCRIT % 39 5 38 5   PLATELETS Thousands/uL 210 191   NEUTROS ABS Thousands/µL 2 89 5 25     Results from last 7 days   Lab Units 02/17/21  0916 02/16/21  1411   SODIUM mmol/L 141 135   POTASSIUM mmol/L 3 5 3 4*   CHLORIDE mmol/L 106 103   CO2 mmol/L 27 23   ANION GAP mmol/L 8 9   BUN mg/dL 9 14   CREATININE mg/dL 0 67 0 67   EGFR ml/min/1 73sq m 95 95   CALCIUM mg/dL 8 7 8 8     Results from last 7 days   Lab Units 02/16/21  1411   AST U/L 15   ALT U/L 9   ALK PHOS U/L 50 8   TOTAL PROTEIN g/dL 6 7   ALBUMIN g/dL 4 2   TOTAL BILIRUBIN mg/dL 0 52     Results from last 7 days   Lab Units 02/17/21  0916 02/16/21  1411   GLUCOSE RANDOM mg/dL 90 102       Results from last 7 days   Lab Units 02/16/21  1411   TROPONIN I ng/mL <0 03     Results from last 7 days   Lab Units 02/16/21  1411   D-DIMER QUANTITATIVE mg/L FEU 0 46     Results from last 7 days   Lab Units 02/16/21  1411   PROTIME seconds 11 0   INR  0 97   PTT seconds 25        CTA NECK AND BRAIN WITH AND WITHOUT CONTRAST   CT brain: Stable examination with no acute intracranial abnormality  CT angiography: Aneurysmal dilatation of the ascending aorta measuring 4 6 cm in diameter  No stenosis, occlusion or thrombosis of the cervical or intracranial vasculature  CTA - CHEST, ABDOMEN AND PELVIS - WITHOUT AND WITH IV CONTRAST   1   Ascending thoracic aortic aneurysm measuring up to 4 6 cm   No dissection  2   Sigmoid diverticular disease without diverticulitis  3   Moderate constipation       ED Treatment:   Medication Administration from 02/16/2021 1356 to 02/16/2021 1753       Date/Time Order Dose Route Action     02/16/2021 1421 sodium chloride 0 9 % infusion 500 mL/hr Intravenous New Bag     02/16/2021 1503 iohexol (OMNIPAQUE) 350 MG/ML injection (MULTI-DOSE) 100 mL 85 mL Intravenous Given     Past Medical History:   Diagnosis Date    Diverticulitis     GERD (gastroesophageal reflux disease)     Hypertension      Present on Admission:   Hypokalemia   Ascending aortic aneurysm (HCC)   Transient loss of consciousness   Essential hypertension    Admitting Diagnosis: Altered mental status [R41 82]  Transient loss of consciousness [R55]    Age/Sex: 63 y o  female    Admission Orders:  TELEMETRY  VS + Neuro Checks Q1HR X 4 - Q4HRS  Orthostatic BP x 1  UP + OOB as Tolerated  Diet Regular: House  MRI Brain  EEG    Scheduled Medications:  ascorbic acid, 500 mg, Oral, Daily  enoxaparin, 40 mg, Subcutaneous, Daily  metoprolol succinate, 50 mg, Oral, Daily  senna, 1 tablet, Oral, Daily    Continuous IV Infusions:  IVF sodium chloride 0 9 % infusion    Ordered Dose: 500 mL/hr Route: Intravenous Frequency: Continuous @ 500 mL/hr   Administration Dose: 500 mL/hr      Scheduled Start Date/Time: 02/16/21 1415 End Date/Time: 02/16/21 1734        PRN Meds:  acetaminophen, 650 mg, Oral, Q6H PRN GIVEN X 1  al mag oxide-diphenhydramine-lidocaine viscous, 10 mL, Swish & Spit, Q4H PRN GIVEN X 1    IP CONSULT TO NEUROLOGY    Network Utilization Review Department  ATTENTION: Please call with any questions or concerns to [phone] and carefully listen to the prompts so that you are directed to the right person  All voicemails are confidential   Kortney Arana all requests for admission clinical reviews, approved or denied determinations and any other requests to dedicated fax number below belonging to the campus where the patient is receiving treatment   List of dedicated fax numbers for the Facilities:  [address] DENIALS (Administrative/Medical Necessity) [phone]   [address] (Maternity/NICU/Pediatrics) 261 [facility] [address] [place] [address] [place] [address] 115 [facility] (  Jesse Kilpatrick "Daxa" 103) [address] [place] [address] [address] 1481 [phone]   [address] 951 [phone]

## 2021-02-17 NOTE — ASSESSMENT & PLAN NOTE
CT revealed a colonic diverticulosis without evidence of acute diverticulitis  Moderate constipation also demonstrated  Status post sleeve gastrectomy

## 2021-02-17 NOTE — PLAN OF CARE
Problem: Potential for Falls  Goal: Patient will remain free of falls  Description: INTERVENTIONS:  - Assess patient frequently for physical needs  -  Identify cognitive and physical deficits and behaviors that affect risk of falls  -  [place] fall precautions as indicated by assessment   - Educate patient/family on patient safety including physical limitations  - Instruct patient to call for assistance with activity based on assessment  - Modify environment to reduce risk of injury  - Consider OT/PT consult to assist with strengthening/mobility  Outcome: Progressing     Problem: NEUROSENSORY - ADULT  Goal: Achieves stable or improved neurological status  Description: INTERVENTIONS  - Monitor and report changes in neurological status  - Monitor vital signs such as temperature, blood pressure, glucose, and any other labs ordered   - Initiate measures to prevent increased intracranial pressure  - Monitor for seizure activity and implement precautions if appropriate      Outcome: Progressing  Goal: Remains free of injury related to seizures activity  Description: INTERVENTIONS  - Maintain airway, patient safety  and administer oxygen as ordered  - Monitor patient for seizure activity, document and report duration and description of seizure to physician/advanced practitioner  - If seizure occurs,  ensure patient safety during seizure  - Reorient patient post seizure  - Seizure pads on all 4 side rails  - Instruct patient/family to notify RN of any seizure activity including if an aura is experienced  - Instruct patient/family to call for assistance with activity based on nursing assessment  - Administer anti-seizure medications if ordered    Outcome: Progressing  Goal: Achieves maximal functionality and self care  Description: INTERVENTIONS  - Monitor swallowing and airway patency with patient fatigue and changes in neurological status  - Encourage and assist patient to increase activity and self care     - Encourage visually impaired, hearing impaired and aphasic patients to use assistive/communication devices  Outcome: Progressing     Problem: CARDIOVASCULAR - ADULT  Goal: Maintains optimal cardiac output and hemodynamic stability  Description: INTERVENTIONS:  - Monitor I/O, vital signs and rhythm  - Monitor for S/S and trends of decreased cardiac output  - Administer and titrate ordered vasoactive medications to optimize hemodynamic stability  - Assess quality of pulses, skin color and temperature  - Assess for signs of decreased coronary artery perfusion  - Instruct patient to report change in severity of symptoms  Outcome: Progressing  Goal: Absence of cardiac dysrhythmias or at baseline rhythm  Description: INTERVENTIONS:  - Continuous cardiac monitoring, vital signs, obtain 12 lead EKG if ordered  - Administer antiarrhythmic and heart rate control medications as ordered  - Monitor electrolytes and administer replacement therapy as ordered  Outcome: Progressing     Problem: RESPIRATORY - ADULT  Goal: Achieves optimal ventilation and oxygenation  Description: INTERVENTIONS:  - Assess for changes in respiratory status  - Assess for changes in mentation and behavior  - Position to facilitate oxygenation and minimize respiratory effort  - Oxygen administered by appropriate delivery if ordered  - Initiate smoking cessation education as indicated  - Encourage broncho-pulmonary hygiene including cough, deep breathe, Incentive Spirometry  - Assess the need for suctioning and aspirate as needed  - Assess and instruct to report SOB or any respiratory difficulty  - Respiratory Therapy support as indicated  Outcome: Progressing     Problem: METABOLIC, FLUID AND ELECTROLYTES - ADULT  Goal: Electrolytes maintained within normal limits  Description: INTERVENTIONS:  - Monitor labs and assess patient for signs and symptoms of electrolyte imbalances  - Administer electrolyte replacement as ordered  - Monitor response to electrolyte replacements, including repeat lab results as appropriate  - Instruct patient on fluid and nutrition as appropriate  Outcome: Progressing  Goal: Fluid balance maintained  Description: INTERVENTIONS:  - Monitor labs   - Monitor I/O and WT  - Instruct patient on fluid and nutrition as appropriate  - Assess for signs & symptoms of volume excess or deficit  Outcome: Progressing     Problem: MUSCULOSKELETAL - ADULT  Goal: Maintain or return mobility to safest level of function  Description: INTERVENTIONS:  - Assess patient's ability to carry out ADLs; assess patient's baseline for ADL function and identify physical deficits which impact ability to perform ADLs (bathing, care of mouth/teeth, toileting, grooming, dressing, etc )  - Assess/evaluate cause of self-care deficits   - Assess range of motion  - Assess patient's mobility  - Assess patient's need for assistive devices and provide as appropriate  - Encourage maximum independence but intervene and supervise when necessary  - Involve family in performance of ADLs  - Assess for home care needs following discharge   - Consider OT consult to assist with ADL evaluation and planning for discharge  - Provide patient education as appropriate  Outcome: Progressing     Problem: SAFETY ADULT  Goal: Patient will remain free of falls  Description: INTERVENTIONS:  - Assess patient frequently for physical needs  -  Identify cognitive and physical deficits and behaviors that affect risk of falls    -  [place] fall precautions as indicated by assessment   - Educate patient/family on patient safety including physical limitations  - Instruct patient to call for assistance with activity based on assessment  - Modify environment to reduce risk of injury  - Consider OT/PT consult to assist with strengthening/mobility  Outcome: Progressing     Problem: SKIN/TISSUE INTEGRITY - ADULT  Goal: Skin integrity remains intact  Description: INTERVENTIONS  - Identify patients at risk for skin breakdown  - Assess and monitor skin integrity  - Assess and monitor nutrition and hydration status  - Monitor labs (i e  albumin)  - Assess for incontinence   - Turn and reposition patient  - Assist with mobility/ambulation  - Relieve pressure over bony prominences  - Avoid friction and shearing  - Provide appropriate hygiene as needed including keeping skin clean and dry  - Evaluate need for skin moisturizer/barrier cream  - Collaborate with interdisciplinary team (i e  Nutrition, Rehabilitation, etc )   - Patient/family teaching  Outcome: Adequate for Discharge

## 2021-02-17 NOTE — PLAN OF CARE
Problem: Potential for Falls  Goal: Patient will remain free of falls  Description: INTERVENTIONS:  - Assess patient frequently for physical needs  -  Identify cognitive and physical deficits and behaviors that affect risk of falls  -  [place] fall precautions as indicated by assessment   - Educate patient/family on patient safety including physical limitations  - Instruct patient to call for assistance with activity based on assessment  - Modify environment to reduce risk of injury  - Consider OT/PT consult to assist with strengthening/mobility  Outcome: Progressing     Problem: NEUROSENSORY - ADULT  Goal: Achieves stable or improved neurological status  Description: INTERVENTIONS  - Monitor and report changes in neurological status  - Monitor vital signs such as temperature, blood pressure, glucose, and any other labs ordered   - Initiate measures to prevent increased intracranial pressure  - Monitor for seizure activity and implement precautions if appropriate      Outcome: Progressing  Goal: Remains free of injury related to seizures activity  Description: INTERVENTIONS  - Maintain airway, patient safety  and administer oxygen as ordered  - Monitor patient for seizure activity, document and report duration and description of seizure to physician/advanced practitioner  - If seizure occurs,  ensure patient safety during seizure  - Reorient patient post seizure  - Seizure pads on all 4 side rails  - Instruct patient/family to notify RN of any seizure activity including if an aura is experienced  - Instruct patient/family to call for assistance with activity based on nursing assessment  - Administer anti-seizure medications if ordered    Outcome: Progressing  Goal: Achieves maximal functionality and self care  Description: INTERVENTIONS  - Monitor swallowing and airway patency with patient fatigue and changes in neurological status  - Encourage and assist patient to increase activity and self care     - Encourage visually impaired, hearing impaired and aphasic patients to use assistive/communication devices  Outcome: Progressing     Problem: CARDIOVASCULAR - ADULT  Goal: Maintains optimal cardiac output and hemodynamic stability  Description: INTERVENTIONS:  - Monitor I/O, vital signs and rhythm  - Monitor for S/S and trends of decreased cardiac output  - Administer and titrate ordered vasoactive medications to optimize hemodynamic stability  - Assess quality of pulses, skin color and temperature  - Assess for signs of decreased coronary artery perfusion  - Instruct patient to report change in severity of symptoms  Outcome: Progressing  Goal: Absence of cardiac dysrhythmias or at baseline rhythm  Description: INTERVENTIONS:  - Continuous cardiac monitoring, vital signs, obtain 12 lead EKG if ordered  - Administer antiarrhythmic and heart rate control medications as ordered  - Monitor electrolytes and administer replacement therapy as ordered  Outcome: Progressing     Problem: RESPIRATORY - ADULT  Goal: Achieves optimal ventilation and oxygenation  Description: INTERVENTIONS:  - Assess for changes in respiratory status  - Assess for changes in mentation and behavior  - Position to facilitate oxygenation and minimize respiratory effort  - Oxygen administered by appropriate delivery if ordered  - Initiate smoking cessation education as indicated  - Encourage broncho-pulmonary hygiene including cough, deep breathe, Incentive Spirometry  - Assess the need for suctioning and aspirate as needed  - Assess and instruct to report SOB or any respiratory difficulty  - Respiratory Therapy support as indicated  Outcome: Progressing     Problem: GASTROINTESTINAL - ADULT  Goal: Minimal or absence of nausea and/or vomiting  Description: INTERVENTIONS:  - Administer IV fluids if ordered to ensure adequate hydration  - Maintain NPO status until nausea and vomiting are resolved  - Nasogastric tube if ordered  - Administer ordered antiemetic medications as needed  - Provide nonpharmacologic comfort measures as appropriate  - Advance diet as tolerated, if ordered  - Consider nutrition services referral to assist patient with adequate nutrition and appropriate food choices  Outcome: Progressing  Goal: Maintains or returns to baseline bowel function  Description: INTERVENTIONS:  - Assess bowel function  - Encourage oral fluids to ensure adequate hydration  - Administer IV fluids if ordered to ensure adequate hydration  - Administer ordered medications as needed  - Encourage mobilization and activity  - Consider nutritional services referral to assist patient with adequate nutrition and appropriate food choices  Outcome: Progressing     Problem: GENITOURINARY - ADULT  Goal: Maintains or returns to baseline urinary function  Description: INTERVENTIONS:  - Assess urinary function  - Encourage oral fluids to ensure adequate hydration if ordered  - Administer IV fluids as ordered to ensure adequate hydration  - Administer ordered medications as needed  - Offer frequent toileting  - Follow urinary retention protocol if ordered  Outcome: Progressing     Problem: METABOLIC, FLUID AND ELECTROLYTES - ADULT  Goal: Electrolytes maintained within normal limits  Description: INTERVENTIONS:  - Monitor labs and assess patient for signs and symptoms of electrolyte imbalances  - Administer electrolyte replacement as ordered  - Monitor response to electrolyte replacements, including repeat lab results as appropriate  - Instruct patient on fluid and nutrition as appropriate  Outcome: Progressing  Goal: Fluid balance maintained  Description: INTERVENTIONS:  - Monitor labs   - Monitor I/O and WT  - Instruct patient on fluid and nutrition as appropriate  - Assess for signs & symptoms of volume excess or deficit  Outcome: Progressing  Goal: Glucose maintained within target range  Description: INTERVENTIONS:  - Monitor Blood Glucose as ordered  - Assess for signs and symptoms of hyperglycemia and hypoglycemia  - Administer ordered medications to maintain glucose within target range  - Assess nutritional intake and initiate nutrition service referral as needed  Outcome: Progressing     Problem: SKIN/TISSUE INTEGRITY - ADULT  Goal: Skin integrity remains intact  Description: INTERVENTIONS  - Identify patients at risk for skin breakdown  - Assess and monitor skin integrity  - Assess and monitor nutrition and hydration status  - Monitor labs (i e  albumin)  - Assess for incontinence   - Turn and reposition patient  - Assist with mobility/ambulation  - Relieve pressure over bony prominences  - Avoid friction and shearing  - Provide appropriate hygiene as needed including keeping skin clean and dry  - Evaluate need for skin moisturizer/barrier cream  - Collaborate with interdisciplinary team (i e  Nutrition, Rehabilitation, etc )   - Patient/family teaching  Outcome: Progressing     Problem: MUSCULOSKELETAL - ADULT  Goal: Maintain or return mobility to safest level of function  Description: INTERVENTIONS:  - Assess patient's ability to carry out ADLs; assess patient's baseline for ADL function and identify physical deficits which impact ability to perform ADLs (bathing, care of mouth/teeth, toileting, grooming, dressing, etc )  - Assess/evaluate cause of self-care deficits   - Assess range of motion  - Assess patient's mobility  - Assess patient's need for assistive devices and provide as appropriate  - Encourage maximum independence but intervene and supervise when necessary  - Involve family in performance of ADLs  - Assess for home care needs following discharge   - Consider OT consult to assist with ADL evaluation and planning for discharge  - Provide patient education as appropriate  Outcome: Progressing  Goal: Maintain proper alignment of affected body part  Description: INTERVENTIONS:  - Support, maintain and protect limb and body alignment  - Provide patient/ family with appropriate education  Outcome: Progressing     Problem: PAIN - ADULT  Goal: Verbalizes/displays adequate comfort level or baseline comfort level  Description: Interventions:  - Encourage patient to monitor pain and request assistance  - Assess pain using appropriate pain scale  - Administer analgesics based on type and severity of pain and evaluate response  - Implement non-pharmacological measures as appropriate and evaluate response  - Consider cultural and social influences on pain and pain management  - Notify physician/advanced practitioner if interventions unsuccessful or patient reports new pain  Outcome: Progressing     Problem: INFECTION - ADULT  Goal: Absence or prevention of progression during hospitalization  Description: INTERVENTIONS:  - Assess and monitor for signs and symptoms of infection  - Monitor lab/diagnostic results  - Monitor all insertion sites, i e  indwelling lines, tubes, and drains  - Monitor endotracheal if appropriate and nasal secretions for changes in amount and color  - Almo appropriate cooling/warming therapies per order  - Administer medications as ordered  - Instruct and encourage patient and family to use good hand hygiene technique  - Identify and instruct in appropriate isolation precautions for identified infection/condition  Outcome: Progressing  Goal: Absence of fever/infection during neutropenic period  Description: INTERVENTIONS:  - Monitor WBC    Outcome: Progressing     Problem: SAFETY ADULT  Goal: Patient will remain free of falls  Description: INTERVENTIONS:  - Assess patient frequently for physical needs  -  Identify cognitive and physical deficits and behaviors that affect risk of falls    -  [place] fall precautions as indicated by assessment   - Educate patient/family on patient safety including physical limitations  - Instruct patient to call for assistance with activity based on assessment  - Modify environment to reduce risk of injury  - Consider OT/PT consult to assist with strengthening/mobility  Outcome: Progressing  Goal: Maintain or return to baseline ADL function  Description: INTERVENTIONS:  -  Assess patient's ability to carry out ADLs; assess patient's baseline for ADL function and identify physical deficits which impact ability to perform ADLs (bathing, care of mouth/teeth, toileting, grooming, dressing, etc )  - Assess/evaluate cause of self-care deficits   - Assess range of motion  - Assess patient's mobility; develop plan if impaired  - Assess patient's need for assistive devices and provide as appropriate  - Encourage maximum independence but intervene and supervise when necessary  - Involve family in performance of ADLs  - Assess for home care needs following discharge   - Consider OT consult to assist with ADL evaluation and planning for discharge  - Provide patient education as appropriate  Outcome: Progressing  Goal: Maintain or return mobility status to optimal level  Description: INTERVENTIONS:  - Assess patient's baseline mobility status (ambulation, transfers, stairs, etc )    - Identify cognitive and physical deficits and behaviors that affect mobility  - Identify mobility aids required to assist with transfers and/or ambulation (gait belt, sit-to-stand, lift, walker, cane, etc )  - Challenge fall precautions as indicated by assessment  - Record patient progress and toleration of activity level on Mobility SBAR; progress patient to next Phase/Stage  - Instruct patient to call for assistance with activity based on assessment  - Consider rehabilitation consult to assist with strengthening/weightbearing, etc   Outcome: Progressing     Problem: DISCHARGE PLANNING  Goal: Discharge to home or other facility with appropriate resources  Description: INTERVENTIONS:  - Identify barriers to discharge w/patient and caregiver  - Arrange for needed discharge resources and transportation as appropriate  - Identify discharge learning needs (meds, wound care, etc )  - Arrange for interpretive services to assist at discharge as needed  - Refer to Case Management Department for coordinating discharge planning if the patient needs post-hospital services based on physician/advanced practitioner order or complex needs related to functional status, cognitive ability, or social support system  Outcome: Progressing

## 2021-02-17 NOTE — DISCHARGE SUMMARY
Discharge- Sandra Gayle 1960, 63 y o  female MRN: [id number]    Unit/Bed#: -01 Encounter: [id number]    Primary Care Provider: Hortensia Dos Santos DO   Date and time admitted to hospital: 2/16/2021  1:57 PM        * Transient loss of consciousness  Assessment & Plan  Unclear etiology  Reports to have lost recollection of events that happened post shoveling snow  Denies biting of the tongue, loss of continence, or palpitation  Reports history of similar events in the past that have not been worked up for   - Will order neuro checks  - will monitor on telemetry  - Orthostatic vitals  - Neurology consulted  Appreciate input    Diverticulosis  Assessment & Plan  CT revealed a colonic diverticulosis without evidence of acute diverticulitis  Moderate constipation also demonstrated  Status post sleeve gastrectomy  Ascending aortic aneurysm [facility])  Assessment & Plan  CTA study revealed aneurysmal dilatation of the ascending aorta measuring 4 6 cm in transverse diameter  Will benefit from follow up with vascular surgery as OP in the next 6 months to assess stability  Essential hypertension  Assessment & Plan  Presented with elevated blood pressure 162/101 mmHg  On metoprolol 50 mg  Continue home medication      Hypokalemia-resolved as of 2/17/2021  Assessment & Plan  Hypokalemia of 3 4  Will replace accordingly  Follow AM BMP        Discharging Resident Physician: Lissette Vázquez MD  Attending: Kenny Wynn MD  PCP: Hortensia Dos Santos DO  Admission Date: 2/16/2021  Admission Date:   Admission Orders (From admission, onward)     Ordered        02/16/21 1616  Place in Observation  Once                   Discharge Date: 02/17/21    Disposition:     Home    Reason for Admission:  Transient loss of consciousness    Consultations During Hospital Stay:  · Neurology    Procedures Performed:     Orders Placed This Encounter   Procedures    ED ECG Documentation Only       Diagnosis:    Resolved Problems  Date Reviewed: 2/17/2021          Resolved    Hypokalemia 2/17/2021     Resolved by  Landon Morris MD          Principal Problem:    Transient loss of consciousness  Active Problems:    Essential hypertension    Ascending aortic aneurysm (HCC)    Diverticulosis      Incidental Findings:   · Ascending thoracic aortic aneurysm measuring up to 4 6 cm  No dissection  · Sigmoid diverticular disease without diverticulitis     Outpatient Tests Requested:  · MRI brain  · EEG    Complications:  None    Hospital Course:     Cuate Preston is a 63 y o  female patient who originally presented to the hospital on 2/16/2021 due to a questionable transient loss of consciousness  On presentation, CT head was unremarkable  CTA revealed incidentally aortic aneurysm without dissection  Noted to be hypokalemic which was replaced  Neurology was consulted  Given suspicions of seizure, patient was advised to have MRI head and EEG  Patient will have that as outpatient  Patient has been advised to follow-up with neurology, on her primary care physician on discharge  At the time of discharge, patient appears hemodynamically and clinically stable  No new medications were added  Condition at Discharge: stable     Discharge Day Visit / Exam:     Subjective:  Patient seen and examined at bedside  Reports no new complaints today  Reports no transit loss of consciousness  Denies numbness, tingling, or diplopia    Vitals: Blood Pressure: 146/87 (02/17/21 1111)  Pulse: (!) 54 (02/17/21 1111)  Temperature: (!) 97 4 °F (36 3 °C) (02/17/21 1111)  Temp Source: Tympanic (02/17/21 1111)  Respirations: 16 (02/17/21 1111)  Height: 5' 4 5" (163 8 cm) (02/16/21 1827)  Weight - Scale: 75 4 kg (166 lb 3 6 oz) (02/16/21 1827)  SpO2: 97 % (02/17/21 1111)  Exam:   Physical Exam  Vitals signs reviewed  Constitutional:       General: She is not in acute distress  Appearance: She is not toxic-appearing  HENT:      Head: Normocephalic  Cardiovascular:      Rate and Rhythm: Normal rate  Pulses: Normal pulses  Heart sounds: No murmur  Pulmonary:      Effort: Pulmonary effort is normal  No respiratory distress  Breath sounds: Normal breath sounds  Abdominal:      General: Bowel sounds are normal  There is no distension  Palpations: Abdomen is soft  Tenderness: There is no abdominal tenderness  Musculoskeletal:      Right lower leg: No edema  Left lower leg: No edema  Neurological:      General: No focal deficit present  Mental Status: She is alert and oriented to person, place, and time  Mental status is at baseline  Psychiatric:         Mood and Affect: Mood normal        Discussion with Family:  Discussed with patient, patient is agreeable with the plan  Discharge instructions/Information to patient and family:   See after visit summary for information provided to patient and family  Provisions for Follow-Up Care:  See after visit summary for information related to follow-up care and any pertinent home health orders  Planned Readmission: No     Discharge Medications:  See after visit summary for reconciled discharge medications provided to patient and family  Discharge Statement :  I spent 25 minutes discharging the patient  This time was spent on the day of discharge  I had direct contact with the patient on the day of discharge  Additional documentation is required if more than 30 minutes were spent on discharge           ** Please Note: This note has been constructed using a voice recognition system **

## 2021-02-17 NOTE — TELEMEDICINE
TeleConsultation - Neurology   Nitish Espinal 63 y o  female MRN: [id number]  Unit/Bed#: -01 Encounter: [id number]      REQUIRED DOCUMENTATION:     1  This service was provided via Telemedicine  2  Provider located at [facility] office   3  TeleMed provider: Corazon Jean-Baptiste MD   4  Identify all parties in room with patient during tele consult:  Easton Rosales RN  5  After connecting through [employer], patient was identified by name and date of birth and assistant checked wristband  Patient was then informed that this was a Telemedicine visit and that the exam was being conducted confidentially over secure lines  My office door was closed  No one else was in the room  Patient acknowledged consent and understanding of privacy and security of the Telemedicine visit, and gave us permission to have the assistant stay in the room in order to assist with the history and to conduct the exam   I informed the patient that I have reviewed their record in Epic and presented the opportunity for them to ask any questions regarding the visit today  The patient agreed to participate  Assessment/Plan     This is a 64 y/o F who is admitted to the hospital with episodes of disorientation lasting for 2-3 hours, and had similar episode 1 year ago and another one 2 years ago  She does not recall what happens for part of the episode  I am concerned about seizure at this point  I personally reviewed her CTA head and neck which was negative for any LVO/stenosis  PLAN:   -recommend routine EEG  -hold off on treating these yet until we have more clarification regarding the episode  -Obtain MRI brain with and without contrast for structural abnormalities  -spoke to patient about "no driving from this point on until she hears from [facility] dot"  -will fill out MARIAJOSE dot form  Nitish Espinal will need follow up in in 4 weeks with epilepsy attending or advance practitioner   She will not require outpatient neurological testing  History of Present Illness     Reason for Consult / Principal Problem: disorientation  Hx and PE limited by: n/a  HPI: Humberto Charles is a 63 y o  right handed female who presents with several episodes of confusion/disorientation    She says that she had coffee yesterday am and then she had mild breakfast and she was pushing snow off her deck and she felt a "little disoriented" and her vision was getting funny and then she knows what she is looking at but she cannot figure out what she is looking at  She felt lightheaded and she could not figure out where she was  She went inside the house and she does not remember what happened and two hours later your clothes were all off, and your shoes were somewhere else  She called 9-1-1  She says that she had other episodes where she was grocery store and she felt disoriented and she had to hold onto shelfing and she said she paid quickly and she got in her car but does not remember driving her home  She says that she walked away from the store and this happened 1 year ago  No febrile seizures  No meningitis  No developmental delays as a child  No family history of seizures  Inpatient consult to Neurology  Consult performed by: Robyn Carlos MD  Consult ordered by: Ahmet Lopez MD           Review of Systems   Constitutional: Negative  HENT: Negative  Eyes: Negative  Respiratory: Negative  Cardiovascular: Negative  Gastrointestinal: Negative  Endocrine: Negative  Genitourinary: Negative  Musculoskeletal: Negative  Skin: Negative  Allergic/Immunologic: Negative  Neurological: Negative  Hematological: Negative  Psychiatric/Behavioral: Negative  All other systems reviewed and are negative        Historical Information   Past Medical History:   Diagnosis Date    Diverticulitis     GERD (gastroesophageal reflux disease)     Hypertension      Past Surgical History:   Procedure Laterality Date    ABDOMINAL SURGERY      CHOLECYSTECTOMY      HYSTERECTOMY       Social History   Social History     Substance and Sexual Activity   Alcohol Use Yes    Alcohol/week: 1 0 standard drinks    Types: 1 Glasses of wine per week    Frequency: 2-4 times a month     Social History     Substance and Sexual Activity   Drug Use Yes    Types: Marijuana    Comment: medical marijuana card     E-Cigarette/Vaping    E-Cigarette Use Never User      E-Cigarette/Vaping Substances     Social History     Tobacco Use   Smoking Status Never Smoker   Smokeless Tobacco Never Used     Family History:   Family History   Problem Relation Age of Onset    Hypertension Mother     Hypertension Father            Meds/Allergies   all current active meds have been reviewed, current meds:   Current Facility-Administered Medications   Medication Dose Route Frequency    acetaminophen (TYLENOL) tablet 650 mg  650 mg Oral Q6H PRN    al mag oxide-diphenhydramine-lidocaine viscous (MAGIC MOUTHWASH) suspension 10 mL  10 mL Swish & Spit Q4H PRN    ascorbic acid (VITAMIN C) tablet 500 mg  500 mg Oral Daily    enoxaparin (LOVENOX) subcutaneous injection 40 mg  40 mg Subcutaneous Daily    metoprolol succinate (TOPROL-XL) 24 hr tablet 50 mg  50 mg Oral Daily    senna (SENOKOT) tablet 8 6 mg  1 tablet Oral Daily    and PTA meds:   Prior to Admission Medications   Prescriptions Last Dose Informant Patient Reported? Taking?   aspirin (ECOTRIN LOW STRENGTH) 81 mg EC tablet 2/15/2021 at 1200  Yes Yes   Sig: Take 81 mg by mouth daily Every other day   metoprolol succinate (TOPROL-XL) 50 mg 24 hr tablet 2/16/2021 at 1200  Yes Yes   Sig: Take 50 mg by mouth daily      Facility-Administered Medications: None       No Known Allergies    Objective   Vitals:Blood pressure 134/94, pulse 62, temperature 98 1 °F (36 7 °C), temperature source Tympanic, resp  rate 18, height 5' 4 5" (1 638 m), weight 75 4 kg (166 lb 3 6 oz), SpO2 98 %  ,Body mass index is 28 09 kg/m²  Intake/Output Summary (Last 24 hours) at 2/17/2021 0924  Last data filed at 2/17/2021 0701  Gross per 24 hour   Intake 340 ml   Output --   Net 340 ml       Invasive Devices: Invasive Devices     Peripheral Intravenous Line            Peripheral IV 02/16/21 Left Antecubital less than 1 day                Physical Exam   General - alert, awake, follows commands, oriented to time, place and person, able to spell WORLD backwards  Skin - no rashes  Chest - clear to ausculation bilaterally per RN  Heart - normal S1, S2, regular per RN  Abdomen - soft, non-tender per RN  Extremities - no swelling noted  Neuro exam -   Speech - no aphasia noted, fluent, no dysarthria noted   Cranial nerves - Pupils equal and reacting per RN, EOMI, no facial asymmetry noted, facial sensation intact to soft touch throughout, tongue midline able to wiggle it side to side, hard to visualize uvula/palate via video, shoulder shrug symmetric bilaterally, hearing intact to finger rubbing  Motor - no pronator drift noted, able to squeeze both hands symmetrically according to the RN, hard to assess tone due to tele  Coordination - no ataxia noted  Sensory - intact to soft touch in arms and legs per RN  Gait - deferred    Lab Results:   I have personally reviewed pertinent reports    , CBC:   Results from last 7 days   Lab Units 02/17/21  0916 02/16/21  1411   WBC Thousand/uL 4 68 6 34   RBC Million/uL 4 59 4 56   HEMOGLOBIN g/dL 13 0 13 1   HEMATOCRIT % 39 5 38 5   MCV fL 86 84   PLATELETS Thousands/uL 210 191   , BMP/CMP:   Results from last 7 days   Lab Units 02/16/21  1411   SODIUM mmol/L 135   POTASSIUM mmol/L 3 4*   CHLORIDE mmol/L 103   CO2 mmol/L 23   BUN mg/dL 14   CREATININE mg/dL 0 67   CALCIUM mg/dL 8 8   AST U/L 15   ALT U/L 9   ALK PHOS U/L 50 8   EGFR ml/min/1 73sq m 95   , Vitamin B12:   , HgBA1C:   , TSH:   , Coagulation:   Results from last 7 days   Lab Units 02/16/21  1411   INR  0 97   , Lipid Profile:   , Ammonia:   , Urinalysis:       Invalid input(s): URIBILINOGEN, Drug Screen:   , Medication Drug Levels:       Invalid input(s): CARBAMAZEPINE,  PHENOBARB, LACOSAMIDE, OXCARBAZEPINE  Imaging Studies: I have personally reviewed pertinent reports  EKG, Pathology, and Other Studies: I have personally reviewed pertinent reports      VTE Prophylaxis: Enoxaparin (Lovenox)    Code Status: Level 1 - Full Code  Advance Directive and Living Will:      Power of :    POLST:      Counseling / Coordination of Care  N/A

## 2021-02-17 NOTE — NURSING NOTE
AVS reviewed with pt  Pt verbalizes understanding of all instructions  IV removed  Monitor removed  Script for EEG given  Pt told she must call and schedule MRI and EEG

## 2021-02-17 NOTE — DISCHARGE INSTR - AVS FIRST PAGE
DISCHARGE INSTRUCTIONS   1  Follow up with Dr Sagar Burciaga, DO in one week  in regards to recent hospitalization     Follow-up with Dr Marcus Mejia in 1 week for neurology follow up    Follow ups:-  - MRI brain with/ without contrast  - EEG awake or drowsy    2  Take medications regularly        3  Come back to the ER if symptoms recur or worsen   4  Activity as tolerated  5  Diet : Regular                Cut back on coffee to reduce diuresis

## 2021-02-19 LAB
ATRIAL RATE: 56 BPM
P AXIS: 29 DEGREES
PR INTERVAL: 132 MS
QRS AXIS: 11 DEGREES
QRSD INTERVAL: 105 MS
QT INTERVAL: 493 MS
QTC INTERVAL: 476 MS
T WAVE AXIS: 29 DEGREES
VENTRICULAR RATE: 56 BPM

## 2021-02-19 PROCEDURE — 93010 ELECTROCARDIOGRAM REPORT: CPT | Performed by: INTERNAL MEDICINE

## 2021-02-22 ENCOUNTER — HOSPITAL ENCOUNTER (OUTPATIENT)
Dept: NEUROLOGY | Facility: HOSPITAL | Age: 61
Discharge: HOME | End: 2021-02-22
Attending: PSYCHIATRY & NEUROLOGY
Payer: COMMERCIAL

## 2021-02-22 DIAGNOSIS — R55 TRANSIENT LOSS OF CONSCIOUSNESS: ICD-10-CM

## 2021-02-22 PROCEDURE — 95816 EEG AWAKE AND DROWSY: CPT

## 2021-02-23 ENCOUNTER — TELEPHONE (OUTPATIENT)
Dept: NEUROLOGY | Facility: CLINIC | Age: 61
End: 2021-02-23

## 2021-02-23 PROCEDURE — 95819 EEG AWAKE AND ASLEEP: CPT | Performed by: PSYCHIATRY & NEUROLOGY

## 2021-02-23 NOTE — TELEPHONE ENCOUNTER
Patient calling because she was recently in the hospital for suspected LOC and was reported to [facility]  Patient denies losing consciousness and states that she cannot lose her DL  Asking if there is a process to get her DL sooner  Advised that she would need to complete the tests that have been ordered and also be assessed by the neurologist  Patient also asking how she will get the results from her MRI and EEG  Advised that her EEG is still resulting and not final yet  Patient's pcp is out of network  Asking if we could advise on the results  Patient scheduled to see you 3/17  EEG completed yesterday: Results pending  MRI: Scheduled for 2/24

## 2021-02-24 ENCOUNTER — HOSPITAL ENCOUNTER (OUTPATIENT)
Dept: RADIOLOGY | Facility: HOSPITAL | Age: 61
Discharge: HOME | End: 2021-02-24
Payer: COMMERCIAL

## 2021-02-24 DIAGNOSIS — R55 TRANSIENT LOSS OF CONSCIOUSNESS: ICD-10-CM

## 2021-02-24 PROCEDURE — A9585 GADOBUTROL INJECTION: HCPCS | Performed by: INTERNAL MEDICINE

## 2021-02-24 PROCEDURE — G1004 CDSM NDSC: HCPCS

## 2021-02-24 PROCEDURE — 70553 MRI BRAIN STEM W/O & W/DYE: CPT

## 2021-02-24 RX ADMIN — GADOBUTROL 7.5 ML: 604.72 INJECTION INTRAVENOUS at 14:48

## 2021-02-26 ENCOUNTER — TELEPHONE (OUTPATIENT)
Dept: OTHER | Facility: OTHER | Age: 61
End: 2021-02-26

## 2021-02-26 ENCOUNTER — OFFICE VISIT (OUTPATIENT)
Dept: CARDIAC SURGERY | Facility: CLINIC | Age: 61
End: 2021-02-26
Payer: COMMERCIAL

## 2021-02-26 VITALS
HEART RATE: 64 BPM | WEIGHT: 168.3 LBS | DIASTOLIC BLOOD PRESSURE: 100 MMHG | SYSTOLIC BLOOD PRESSURE: 140 MMHG | OXYGEN SATURATION: 98 % | HEIGHT: 64 IN | RESPIRATION RATE: 18 BRPM | BODY MASS INDEX: 28.73 KG/M2 | TEMPERATURE: 98.4 F

## 2021-02-26 DIAGNOSIS — I71.2 ASCENDING AORTIC ANEURYSM (HCC): Primary | ICD-10-CM

## 2021-02-26 PROCEDURE — 99204 OFFICE O/P NEW MOD 45 MIN: CPT | Performed by: NURSE PRACTITIONER

## 2021-02-26 RX ORDER — SENNA AND DOCUSATE SODIUM 50; 8.6 MG/1; MG/1
1 TABLET, FILM COATED ORAL DAILY
COMMUNITY
End: 2021-03-17

## 2021-02-26 RX ORDER — MULTIVIT-MIN/IRON FUM/FOLIC AC 7.5 MG-4
1 TABLET ORAL DAILY
COMMUNITY

## 2021-02-26 NOTE — PROGRESS NOTES
[facility]  Teodoro Marques 63 y o  female MRN: [id number]    Physician Requesting Consult: Bill Age, DO    Reason for Consult / Principal Problem: Ascending aortic aneurysm    History of Present Illness: Teodoro Marques is a 63y o  year old female who presents today for initial out patient consultation for an incidental finding of a 46 mm ascending aortic aneurysm  Patient was recently seen in the ER for an episode of altered state of disorientation and "weird sensation" as described by patient  She states she has experienced this 3 times in the past 5-6 years  She states her head "feels weird, she has associated visual disturbance and a feeling she cannot control her body  CTA head and neck were negative  She recently underwent MRI brain and EEG  She is undergoing w/u through neurology  She underwent CTA dissection protocol when in the ER which demonstrated the aortic aneurysm but no rupture or dissection  Upon interview patient denies chest pain, back pain, SOB, numbness tingling, palpitations  She has HTN and has been on Metoprolol Succinate 25 mg daily for at least 10 years, recently increased  She does not smoke  She denies lipid disorders or diabetes  Her FH is significant for HTN - mother and 4 sisters and her father suffered multiple MI's and  during PCI  She denies any FH of aneurysms or premature SCD      Past Medical History:  Past Medical History:   Diagnosis Date    Diverticulitis     GERD (gastroesophageal reflux disease)     Hypertension          Past Surgical History:   Past Surgical History:   Procedure Laterality Date    ABDOMINAL SURGERY      CHOLECYSTECTOMY      HYSTERECTOMY           Family History:  Family History   Problem Relation Age of Onset    Hypertension Mother     Hypertension Father          Social History:    Social History     Substance and Sexual Activity   Alcohol Use Yes    Alcohol/week: 1 0 standard drinks    Types: 1 Glasses of wine per week    Frequency: 4 or more times a week     Social History     Substance and Sexual Activity   Drug Use Yes    Types: Marijuana    Comment: medical marijuana card     Social History     Tobacco Use   Smoking Status Never Smoker   Smokeless Tobacco Never Used         Home Medications:   Prior to Admission medications    Medication Sig Start Date End Date Taking?  Authorizing Provider   aspirin (ECOTRIN LOW STRENGTH) 81 mg EC tablet Take 81 mg by mouth daily Every other day   Yes Historical Provider, MD   metoprolol succinate (TOPROL-XL) 50 mg 24 hr tablet Take 50 mg by mouth daily   Yes Historical Provider, MD   Multiple Vitamins-Minerals (multivitamin with minerals) tablet Take 1 tablet by mouth daily   Yes Historical Provider, MD   senna-docusate sodium (SENOKOT-S) 8 6-50 mg per tablet Take 1 tablet by mouth daily   Yes Historical Provider, MD       Allergies:  No Known Allergies    Review of Systems:   Review of Systems - History obtained from chart review and the patient  General ROS: negative  Psychological ROS: negative  Ophthalmic ROS: negative  ENT ROS: negative  Allergy and Immunology ROS: negative  Hematological and Lymphatic ROS: negative  Endocrine ROS: negative  Respiratory ROS: no cough, shortness of breath, or wheezing  Cardiovascular ROS: no chest pain or dyspnea on exertion  Gastrointestinal ROS: positive for - "anxious stomach" and constipation  negative for - blood in stools, change in bowel habits, diarrhea, hematemesis, melena or swallowing difficulty/pain  Genito-Urinary ROS: no dysuria, trouble voiding, or hematuria  Musculoskeletal ROS: negative  Neurological ROS: see HPI  Dermatological ROS: negative    Vital Signs:   Vitals:    02/26/21 1026   BP: 140/100   BP Location: Right arm   Patient Position: Sitting   Cuff Size: Adult   Pulse: 64   Resp: 18   Temp: 98 4 °F (36 9 °C)   TempSrc: Tympanic   SpO2: 98%   Weight: 76 3 kg (168 lb 4 8 oz)   Height: 5' 4" (1 626 m)       Physical Exam:  General: Alert, oriented, well developed, no acute distress  HEENT/NECK:  PERRLA  No jugular venous distention  Cardiac:Regular rate and rhythm, No murmurs rubs or gallops  Carotid arteries: 2+ pulses, no bruits  Pulmonary:  Breath sounds clear bilaterally  Abdomen:  Non-tender, Non-distended  Positive bowel sounds  Upper extremities: 2+ radial pulses; brisk capillary refill  Lower extremities: Extremities warm/dry  PT/DP pulses 2+ bilaterally  No edema B/L  Neuro: Alert and oriented X 3  Sensation is grossly intact  No focal deficits  Musculoskeletal: MAEE, no deficits, stable gait  Skin: Warm/Dry, without rashes or lesions  Lab Results:   Lab Results   Component Value Date    SODIUM 141 02/17/2021    K 3 5 02/17/2021     02/17/2021    CO2 27 02/17/2021    BUN 9 02/17/2021    CREATININE 0 67 02/17/2021    GLUC 90 02/17/2021    CALCIUM 8 7 02/17/2021     Lab Results   Component Value Date    WBC 4 68 02/17/2021    HGB 13 0 02/17/2021    HCT 39 5 02/17/2021    MCV 86 02/17/2021     02/17/2021     Lab Results   Component Value Date    WBC 4 68 02/17/2021    HGB 13 0 02/17/2021    HCT 39 5 02/17/2021    MCV 86 02/17/2021     02/17/2021     Lab Results   Component Value Date    TROPONINI <0 03 02/16/2021       Imaging Studies:     CTA Chest: 2/16/21  Ascending aortic aneurysm 46 mm    Echocardiogram: ordered    I have personally reviewed pertinent films in PACS    Assessment:  Patient Active Problem List    Diagnosis Date Noted    Diverticulosis 02/17/2021    Ascending aortic aneurysm ([address] ) 02/16/2021    Transient loss of consciousness 02/16/2021    Essential hypertension 04/24/2019       Plan:    CTA imaging performed prior to this visit demonstrates the ascending aorta measuring 46 mm in size at its greatest diameter  These findings were confirmed and shared with the patient today        This aneurysm does not meet surgical criteria:  Ascending aortic aneurysm surgical triggers:  * 4 5 cm or > in the setting of + FH of rupture or dissection  * 5 cm with moderate or worse aortic valve disease  *  5 5 cm regardless of aortic valve function and without genetically associated aortic disease   *  Aortic growth > 4mm / year  *  Bicuspid aortic valve with valve dysfunction enough to meet indications for surgery and concomitant ascending aortic aneurysm 4 5 cm or >  * 4 5 cm or > in setting of existing connective tissue disease of Marfan's syndrome, Angel-Danlos syndrome or familiar aneurysms syndrome        Patient is asymptomatic from a cardiac standpoint  She is hypertensive today  She has no family history  Follow-up monitoring is the treatment plan  Arrangements have been made for future surveillance to be completed with CT chest, without contrast in 1 year  We will order a routine echo to assess aortic valve morphology  Martha Preston was comfortable with our recommendations, and her questions were answered to her satisfaction  Thank you for allowing us to participate in the care of this patient  Aortic Aneurysm Instructions were provided to the patient as follows:    1  No lifting more than 50 pounds  Regular aerobic exercise permitted and recommended  2  Maintain a controlled blood pressure with a goal of less than 140/80 - f/u with PCP  3  Follow up in Aortic Clinic as recommended with radiology follow up as instructed  4  Report to the ER or call 911 immediately with the following signs / symptoms: sudden onset of back pain, chest pain or shortness of breath  5  Remain tobacco free        SIGNATURE: ОЛЕГ Fields  DATE: February 26, 2021  TIME: 11:05 AM

## 2021-02-26 NOTE — LETTER
2021     Hermelinda Toro, [address]     Patient: Sloane Patel   YOB: 1960   Date of Visit: 2021       Dear Dr Marco Antonio Cespedes:    Thank you for referring [place] to me for evaluation  Below are my notes for this consultation  If you have questions, please do not hesitate to call me  I look forward to following your patient along with you  Sincerely,        Lisandro Rojas MD        CC: No Recipients  Mary France, [facility]  2021 11:24 AM  Attested  Aortic Clinic  Sloane Patel 63 y o  female MRN: [id number]    Physician Requesting Consult: Hermelinda Toro DO    Reason for Consult / Principal Problem: Ascending aortic aneurysm    History of Present Illness: Sloane Patel is a 63y o  year old female who presents today for initial out patient consultation for an incidental finding of a 46 mm ascending aortic aneurysm  Patient was recently seen in the ER for an episode of altered state of disorientation and "weird sensation" as described by patient  She states she has experienced this 3 times in the past 5-6 years  She states her head "feels weird, she has associated visual disturbance and a feeling she cannot control her body  CTA head and neck were negative  She recently underwent MRI brain and EEG  She is undergoing w/u through neurology  She underwent CTA dissection protocol when in the ER which demonstrated the aortic aneurysm but no rupture or dissection  Upon interview patient denies chest pain, back pain, SOB, numbness tingling, palpitations  She has HTN and has been on Metoprolol Succinate 25 mg daily for at least 10 years, recently increased  She does not smoke  She denies lipid disorders or diabetes  Her FH is significant for HTN - mother and 4 sisters and her father suffered multiple MI's and  during PCI  She denies any FH of aneurysms or premature SCD      Past Medical History:  Past Medical History:   Diagnosis Date    Diverticulitis     GERD (gastroesophageal reflux disease)     Hypertension          Past Surgical History:   Past Surgical History:   Procedure Laterality Date    ABDOMINAL SURGERY      CHOLECYSTECTOMY      HYSTERECTOMY           Family History:  Family History   Problem Relation Age of Onset    Hypertension Mother     Hypertension Father          Social History:    Social History     Substance and Sexual Activity   Alcohol Use Yes    Alcohol/week: 1 0 standard drinks    Types: 1 Glasses of wine per week    Frequency: 4 or more times a week     Social History     Substance and Sexual Activity   Drug Use Yes    Types: Marijuana    Comment: medical marijuana card     Social History     Tobacco Use   Smoking Status Never Smoker   Smokeless Tobacco Never Used         Home Medications:   Prior to Admission medications    Medication Sig Start Date End Date Taking?  Authorizing Provider   aspirin (ECOTRIN LOW STRENGTH) 81 mg EC tablet Take 81 mg by mouth daily Every other day   Yes Historical Provider, MD   metoprolol succinate (TOPROL-XL) 50 mg 24 hr tablet Take 50 mg by mouth daily   Yes Historical Provider, MD   Multiple Vitamins-Minerals (multivitamin with minerals) tablet Take 1 tablet by mouth daily   Yes Historical Provider, MD   senna-docusate sodium (SENOKOT-S) 8 6-50 mg per tablet Take 1 tablet by mouth daily   Yes Historical Provider, MD       Allergies:  No Known Allergies    Review of Systems:   Review of Systems - History obtained from chart review and the patient  General ROS: negative  Psychological ROS: negative  Ophthalmic ROS: negative  ENT ROS: negative  Allergy and Immunology ROS: negative  Hematological and Lymphatic ROS: negative  Endocrine ROS: negative  Respiratory ROS: no cough, shortness of breath, or wheezing  Cardiovascular ROS: no chest pain or dyspnea on exertion  Gastrointestinal ROS: positive for - "anxious stomach" and constipation  negative for - blood in stools, change in bowel habits, diarrhea, hematemesis, melena or swallowing difficulty/pain  Genito-Urinary ROS: no dysuria, trouble voiding, or hematuria  Musculoskeletal ROS: negative  Neurological ROS: see HPI  Dermatological ROS: negative    Vital Signs:   Vitals:    02/26/21 1026   BP: 140/100   BP Location: Right arm   Patient Position: Sitting   Cuff Size: Adult   Pulse: 64   Resp: 18   Temp: 98 4 °F (36 9 °C)   TempSrc: Tympanic   SpO2: 98%   Weight: 76 3 kg (168 lb 4 8 oz)   Height: 5' 4" (1 626 m)       Physical Exam:  General: Alert, oriented, well developed, no acute distress  HEENT/NECK:  PERRLA  No jugular venous distention  Cardiac:Regular rate and rhythm, No murmurs rubs or gallops  Carotid arteries: 2+ pulses, no bruits  Pulmonary:  Breath sounds clear bilaterally  Abdomen:  Non-tender, Non-distended  Positive bowel sounds  Upper extremities: 2+ radial pulses; brisk capillary refill  Lower extremities: Extremities warm/dry  PT/DP pulses 2+ bilaterally  No edema B/L  Neuro: Alert and oriented X 3  Sensation is grossly intact  No focal deficits  Musculoskeletal: MAEE, no deficits, stable gait  Skin: Warm/Dry, without rashes or lesions      Lab Results:   Lab Results   Component Value Date    SODIUM 141 02/17/2021    K 3 5 02/17/2021     02/17/2021    CO2 27 02/17/2021    BUN 9 02/17/2021    CREATININE 0 67 02/17/2021    GLUC 90 02/17/2021    CALCIUM 8 7 02/17/2021     Lab Results   Component Value Date    WBC 4 68 02/17/2021    HGB 13 0 02/17/2021    HCT 39 5 02/17/2021    MCV 86 02/17/2021     02/17/2021     Lab Results   Component Value Date    WBC 4 68 02/17/2021    HGB 13 0 02/17/2021    HCT 39 5 02/17/2021    MCV 86 02/17/2021     02/17/2021     Lab Results   Component Value Date    TROPONINI <0 03 02/16/2021       Imaging Studies:     CTA Chest: 2/16/21  Ascending aortic aneurysm 46 mm    Echocardiogram: ordered    I have personally reviewed pertinent films in PACS    Assessment:  Patient Active Problem List    Diagnosis Date Noted    Diverticulosis 02/17/2021    Ascending aortic aneurysm ([address] ) 02/16/2021    Transient loss of consciousness 02/16/2021    Essential hypertension 04/24/2019       Plan:    CTA imaging performed prior to this visit demonstrates the ascending aorta measuring 46 mm in size at its greatest diameter  These findings were confirmed and shared with the patient today  This aneurysm does not meet surgical criteria:  Ascending aortic aneurysm surgical triggers:  * 4 5 cm or > in the setting of + FH of rupture or dissection  * 5 cm with moderate or worse aortic valve disease  *  5 5 cm regardless of aortic valve function and without genetically associated aortic disease   *  Aortic growth > 4mm / year  *  Bicuspid aortic valve with valve dysfunction enough to meet indications for surgery and concomitant ascending aortic aneurysm 4 5 cm or >  * 4 5 cm or > in setting of existing connective tissue disease of Marfan's syndrome, Angel-Danlos syndrome or familiar aneurysms syndrome        Patient is asymptomatic from a cardiac standpoint  She is hypertensive today  She has no family history  Follow-up monitoring is the treatment plan  Arrangements have been made for future surveillance to be completed with CT chest, without contrast in 1 year  We will order a routine echo to assess aortic valve morphology  Nuris Mayo was comfortable with our recommendations, and her questions were answered to her satisfaction  Thank you for allowing us to participate in the care of this patient  Aortic Aneurysm Instructions were provided to the patient as follows:    1  No lifting more than 50 pounds  Regular aerobic exercise permitted and recommended  2  Maintain a controlled blood pressure with a goal of less than 140/80 - f/u with PCP  3  Follow up in Aortic Clinic as recommended with radiology follow up as instructed  4   Report to the ER or call 911 immediately with the following signs / symptoms: sudden onset of back pain, chest pain or shortness of breath  5  Remain tobacco free  SIGNATURE: ОЛЕГ De La Paz  DATE: February 26, 2021  TIME: 11:05 AM  Attestation signed by Viky Powers MD at 2/26/2021 12:58 PM:  Pt seen and examined with ОЛЕГ  I agree with the above assessment and plan  It was my pleasure to evaluate Daily Neely today for aortic aneurysm  She is referred for consideration of aortic aneurysm repair  I reviewed all of the available testing and I had a lengthy discussion with the patient and have recommended 1 year follow up with aortic surveillance imaging  We'll also obtain a baseline echo to establish aortic valve function and morphology  I assured her that her aorta is not related to her presyncope, which is under further investigation        Mary Mcginnis MD  DATE: February 26, 2021  TIME: 12:56 PM

## 2021-02-26 NOTE — TELEPHONE ENCOUNTER
Pt called very distressed and worried and would like to receive a call back as soon as possible  She feels she was misdiagnosed and wrongfully had her licence suspended, she said she would like to get this situation resolved before she has to return to work on Monday   She has attempted to call the Neurology office several times and get a hold of someone to figure out what she can do but she hasn't had any luck

## 2021-03-01 ENCOUNTER — DOCUMENTATION (OUTPATIENT)
Dept: CARDIOLOGY UNIT | Facility: HOSPITAL | Age: 61
End: 2021-03-01

## 2021-03-01 ENCOUNTER — TELEPHONE (OUTPATIENT)
Dept: NEUROLOGY | Facility: CLINIC | Age: 61
End: 2021-03-01

## 2021-03-01 NOTE — TELEPHONE ENCOUNTER
Patient calling very upset  States she is calling to review testing and needs to start driving  She was very aggressive over the phone stating she needs to drive because she lives alone and needs money to work  She wants to be driving before Friday  States she is going on vacation and people are depending on her  Leaving Friday  States she also needs a note for work stating she cannot drive if Dr Tammi Kent plans to keep her from driving  She then became more upset stating she was misdiagnosed and needs an answer right now  I informed her Dr Tammi Kent is out of the office today and she states going forward she needs someone who is there full time and now would like to transfer of care  Informed patient of process and [employer] forms filled out on file  Best call back number [phone], She is indeed okay with detailed  She wanted a call back from Dr Tammi Kent herself asap    Informed her that the clinical team would be calling back with a response unless she scheduled an apt

## 2021-03-01 NOTE — TELEPHONE ENCOUNTER
Received notification from [place], [address] [place] [address]  Patient calling in again regarding this encounter  Per [place], she made patient aware again the Dr Carol Tucker is out of the office today      Sent TT to Dr Carol Tucker

## 2021-03-02 NOTE — TELEPHONE ENCOUNTER
Called patient, read Dr Arti Stark message  She continued to express her feelings of agitation and is upset  States "I am putting her in a dilemma because of her job and her life being on hold now"  Answered all her questions regarding [employer] process with forms and licence  Told her to look out for letter from [place] with any more info  Advised she bring this info to us, either to the office or through [employer] to address  She verbalizes understanding  She has no further questions at this time

## 2021-03-02 NOTE — TELEPHONE ENCOUNTER
Have called Ms  Eliublue Avilamaria eugenia And have discussed regarding follow-up with the neurologist   Patient is expressing her concerns about her follow-up appointment and stated that if I can review the results and let [employer] know and get her license back however I hae assured her  that she needs to follow up with the neurologist and make an appointment as early as possible  I have also texted neurologist dr Milad Mills  and she suggested that patient would need a follow-up appointment with office to take care of that  Patient was advised to call back on Monday as she could not reach the office on 2/26/21

## 2021-03-02 NOTE — TELEPHONE ENCOUNTER
Discussed with Dr Devin Ames  Patient had forms filled out because of loss of consciousness  Called, spoke with patient  She continues to state she had not lost consciousness  States she fell sleep and that was it  "if anyone has consciousness when they are sleeping, I Rocio Guardian know who "    States she could not explain what happened when she got to the ED  Reports she had a migraine when she came into the home, and because her head hurt, fell asleep immediately  "That's the route of it"  "If I used the wrong language to explain it, then that's on me"   But I did not lose consciousness "

## 2021-03-10 DIAGNOSIS — Z23 ENCOUNTER FOR IMMUNIZATION: ICD-10-CM

## 2021-03-15 ENCOUNTER — TELEPHONE (OUTPATIENT)
Dept: NEUROLOGY | Facility: CLINIC | Age: 61
End: 2021-03-15

## 2021-03-15 NOTE — TELEPHONE ENCOUNTER
[facility] for patient to [facility] and confirm appointment with Dr Parviz Mims  Gave direct numbers in [place]

## 2021-03-17 ENCOUNTER — TELEPHONE (OUTPATIENT)
Dept: CARDIAC SURGERY | Facility: CLINIC | Age: 61
End: 2021-03-17

## 2021-03-17 ENCOUNTER — OFFICE VISIT (OUTPATIENT)
Dept: NEUROLOGY | Facility: CLINIC | Age: 61
End: 2021-03-17
Payer: COMMERCIAL

## 2021-03-17 VITALS
HEART RATE: 65 BPM | DIASTOLIC BLOOD PRESSURE: 86 MMHG | HEIGHT: 64 IN | BODY MASS INDEX: 27.83 KG/M2 | WEIGHT: 163 LBS | SYSTOLIC BLOOD PRESSURE: 138 MMHG

## 2021-03-17 DIAGNOSIS — R55 TRANSIENT LOSS OF CONSCIOUSNESS: ICD-10-CM

## 2021-03-17 DIAGNOSIS — D32.9 MENINGIOMA (HCC): Primary | ICD-10-CM

## 2021-03-17 DIAGNOSIS — I71.2 THORACIC AORTIC ANEURYSM WITHOUT RUPTURE (HCC): ICD-10-CM

## 2021-03-17 DIAGNOSIS — Z86.69 H/O MIGRAINE WITH AURA: ICD-10-CM

## 2021-03-17 PROCEDURE — 99215 OFFICE O/P EST HI 40 MIN: CPT | Performed by: PSYCHIATRY & NEUROLOGY

## 2021-03-17 NOTE — LETTER
March 17, 2021     Patient: Brittani Roy   YOB: 1960   Date of Visit: 3/17/2021       To Whom it May Concern:    Tushar Katz is under my professional care  She was seen in my office on 3/17/2021  She may return to work on 3/17/21  Patient suffers from periodic migraines and is extremely sensitive to bright lights  Please accommodate her and allow her to wear sun glasses and work in reduced intensity light  If you have any questions or concerns, please don't hesitate to call           Sincerely,          Donna Gautam MD        CC: No Recipients

## 2021-03-17 NOTE — TELEPHONE ENCOUNTER
Spoke with the patient regarding the Echo that was scheduled for 3/22/21  Patient cancelled and does not want to reschedule at this time

## 2021-03-17 NOTE — PROGRESS NOTES
Return NeuroOutpatient Note        Dori Wang  [id number]  63 y o   1960       Neurologic Problem (LOC HFU )        History obtained from:  Patient     HPI/Subjective:    Dori Wang is a 64 yo F with PMH of HTN presents as f/u for an episode of dizziness  Patient was shoveling snow on Feb 16th  She then felt dizzy and nauseous  She went inside and went to sleep for 2-3 hours  There was no focal weakness or paresthesia  Patient had 2 similar events in past  She feels as though it's a feeling before her migraines  She still gets rare migraines  She felt it was like an aura  Denies any sxs since then  She had EEG and it was normal  After hysterectomy, her migraines are very rare, once in a few months  otc excedrin migraine works for her  She has extreme light sensitivity  She had CTA head and neck which had revealed 4 6cm aneurysm of ascending aorta  Her MRI brain showed no acute process but did reveal 7mm left parietal enhancing extra axial focus likely meningioma  Patient has gotten her Covid vaccine  Past Medical History:   Diagnosis Date    Diverticulitis     GERD (gastroesophageal reflux disease)     Hypertension      Social History     Socioeconomic History    Marital status:      Spouse name: Not on file    Number of children: Not on file    Years of education: Not on file    Highest education level: Not on file   Occupational History    Not on file   Social Needs    Financial resource strain: Not on file    Food insecurity     Worry: Not on file     Inability: Not on file    Transportation needs     Medical: Not on file     Non-medical: Not on file   Tobacco Use    Smoking status: Never Smoker    Smokeless tobacco: Never Used   Substance and Sexual Activity    Alcohol use:  Yes     Alcohol/week: 1 0 standard drinks     Types: 1 Glasses of wine per week     Frequency: 4 or more times a week    Drug use: Yes     Types: Marijuana     Comment: medical marijuana card    Sexual activity: Not on file   Lifestyle    Physical activity     Days per week: Not on file     Minutes per session: Not on file    Stress: Not on file   Relationships    Social connections     Talks on phone: Not on file     Gets together: Not on file     Attends [employer] service: Not on file     Active member of club or organization: Not on file     Attends meetings of clubs or organizations: Not on file     Relationship status: Not on file    Intimate partner violence     Fear of current or ex partner: Not on file     Emotionally abused: Not on file     Physically abused: Not on file     Forced sexual activity: Not on file   Other Topics Concern    Not on file   Social History Narrative    Not on file     Family History   Problem Relation Age of Onset    Hypertension Mother     Hypertension Father      No Known Allergies  Current Outpatient Medications on File Prior to Visit   Medication Sig Dispense Refill    aspirin (ECOTRIN LOW STRENGTH) 81 mg EC tablet Take 81 mg by mouth daily Every other day      aspirin-acetaminophen-caffeine (Excedrin Migraine) 250-250-65 MG per tablet TAKE 1 TABLET 3 TIMES DAILY AS NEEDED      metoprolol succinate (TOPROL-XL) 50 mg 24 hr tablet Take 50 mg by mouth daily      Multiple Vitamins-Minerals (multivitamin with minerals) tablet Take 1 tablet by mouth daily      [DISCONTINUED] senna-docusate sodium (SENOKOT-S) 8 6-50 mg per tablet Take 1 tablet by mouth daily       No current facility-administered medications on file prior to visit  Review of Systems   Refer to positive review of systems in HPI     Review of Systems    Constitutional- No fever  Eyes- No visual change  ENT- Hearing normal  CV- No chest pain  Resp- No Shortness of breath  GI- No diarrhea  - Bladder normal  MS- No Arthritis   Skin- No rash  Psych- No depression  Endo- No DM  Heme- No nodes    Vitals:    03/17/21 1624   BP: 138/86   BP Location: Left arm   Patient Position: Sitting   Cuff Size: Large   Pulse: 65   Weight: 73 9 kg (163 lb)   Height: 5' 4" (1 626 m)       PHYSICAL EXAM:  Appearance: No Acute Distress  Ophthalmoscopic: Disc Flat, Normal fundus  Mental status:  Orientation: Awake, Alert, and Orientedx3  Memory: Registation 3/3 Recall 3/3  Attention: normal  Knowledge: good  Language: No aphasia  Speech: No dysarthria  Cranial Nerves:  2 No Visual Defect on Confrontation, Pupils round, equal, reactive to light  3,4,6 Extraocular Movements Intact, no nystagmus  5 Facial Sensation Intact  7 No facial asymmetry  8 Intact hearing  9,10 Palate symmetric, normal gag  11 Good shoulder shrug  12 Tongue Midline  Gait: Stable  Coordination: No ataxia with finger to nose testing, and heel to shin  Sensory: Intact, Symmetric to pinprick, light touch, vibration, and joint position  Muscle Tone: Normal              Muscle exam:  Arm Right Left Leg Right Left   Deltoid 5/5 5/5 Iliopsoas 5/5 5/5   Biceps 5/5 5/5 Quads 5/5 5/5   Triceps 5/5 5/5 Hamstrings 5/5 5/5   Wrist Extension 5/5 5/5 Ankle Dorsi Flexion 5/5 5/5   Wrist Flexion 5/5 5/5 Ankle Plantar Flexion 5/5 5/5   Interossei 5/5 5/5 Ankle Eversion 5/5 5/5   APB 5/5 5/5 Ankle Inversion 5/5 5/5       Reflexes   RJ BJ TJ KJ AJ Plantars Horn's   Right 2+ 2+ 2+ 2+ 2+ Downgoing Not present   Left 2+ 2+ 2+ 2+ 2+ Downgoing Not present     Personal review of  Labs:                 Diagnoses and all orders for this visit:      1  Meningioma ([facility] [address] )     2  Thoracic aortic aneurysm without rupture ([facility] [address] )     3  H/O migraine with aura           Patient's clinical history is not suggestive of seizure  It could've been dizziness from overexertion or migraine aura like she's had previously  She may resume aspirin 81mg  Her BP is fairly controlled  As for meningioma, she can had f/u imaging in 2-3 years  She has seen cardiothoracic surgeon and they will monitor aneurysm annually               Total time of encounter:  40 min  More than 50% of the time was used in counseling and/or coordination of care  Extent of counseling and/or coordination of care        MD [facility]  [address]  [address] [phone]

## 2021-03-18 ENCOUNTER — TELEPHONE (OUTPATIENT)
Dept: NEUROLOGY | Facility: CLINIC | Age: 61
End: 2021-03-18

## 2021-05-25 ENCOUNTER — TELEPHONE (OUTPATIENT)
Dept: NEUROLOGY | Facility: CLINIC | Age: 61
End: 2021-05-25

## 2021-05-25 NOTE — TELEPHONE ENCOUNTER
1st attempt left message schedule consult     Joseph Winigan / Transient loss of consciousness/[facility]

## 2021-09-25 NOTE — TELEPHONE ENCOUNTER
Patient provided with cab voucher to return home. TITUS JoseN RN    Patient requested her work note be faxed  She forgot to get the letter at the office visit yesterday     Faxed letter to: [phone]

## 2021-11-22 ENCOUNTER — TELEPHONE (OUTPATIENT)
Dept: GASTROENTEROLOGY | Facility: CLINIC | Age: 61
End: 2021-11-22

## 2021-12-06 ENCOUNTER — TELEPHONE (OUTPATIENT)
Dept: GASTROENTEROLOGY | Facility: CLINIC | Age: 61
End: 2021-12-06

## 2021-12-07 ENCOUNTER — TELEPHONE (OUTPATIENT)
Dept: GASTROENTEROLOGY | Facility: CLINIC | Age: 61
End: 2021-12-07

## 2021-12-08 ENCOUNTER — TELEPHONE (OUTPATIENT)
Dept: GASTROENTEROLOGY | Facility: CLINIC | Age: 61
End: 2021-12-08

## 2021-12-15 ENCOUNTER — TELEPHONE (OUTPATIENT)
Dept: GASTROENTEROLOGY | Facility: CLINIC | Age: 61
End: 2021-12-15

## 2021-12-16 ENCOUNTER — HOSPITAL ENCOUNTER (OUTPATIENT)
Dept: GASTROENTEROLOGY | Facility: AMBULATORY SURGERY CENTER | Age: 61
Setting detail: OUTPATIENT SURGERY
Discharge: HOME | End: 2021-12-16
Attending: INTERNAL MEDICINE
Payer: COMMERCIAL

## 2021-12-16 ENCOUNTER — ANESTHESIA EVENT (OUTPATIENT)
Dept: GASTROENTEROLOGY | Facility: AMBULATORY SURGERY CENTER | Age: 61
End: 2021-12-16

## 2021-12-16 ENCOUNTER — ANESTHESIA (OUTPATIENT)
Dept: GASTROENTEROLOGY | Facility: AMBULATORY SURGERY CENTER | Age: 61
End: 2021-12-16

## 2021-12-16 VITALS
DIASTOLIC BLOOD PRESSURE: 90 MMHG | HEART RATE: 73 BPM | TEMPERATURE: 97.5 F | RESPIRATION RATE: 18 BRPM | SYSTOLIC BLOOD PRESSURE: 125 MMHG | HEIGHT: 63 IN | OXYGEN SATURATION: 100 % | WEIGHT: 166 LBS | BODY MASS INDEX: 29.41 KG/M2

## 2021-12-16 DIAGNOSIS — K57.33 DIVERTICULITIS OF LARGE INTESTINE WITH BLEEDING, UNSPECIFIED COMPLICATION STATUS: Primary | ICD-10-CM

## 2021-12-16 DIAGNOSIS — Z86.010 HISTORY OF COLON POLYPS: ICD-10-CM

## 2021-12-16 PROCEDURE — 00812 ANES LWR INTST SCR COLSC: CPT | Performed by: NURSE ANESTHETIST, CERTIFIED REGISTERED

## 2021-12-16 PROCEDURE — 45380 COLONOSCOPY AND BIOPSY: CPT | Performed by: INTERNAL MEDICINE

## 2021-12-16 RX ORDER — SODIUM CHLORIDE 9 MG/ML
30 INJECTION, SOLUTION INTRAVENOUS CONTINUOUS
Status: DISCONTINUED | OUTPATIENT
Start: 2021-12-16 | End: 2021-12-20 | Stop reason: HOSPADM

## 2021-12-16 RX ORDER — GLYCOPYRROLATE 0.2 MG/ML
INJECTION INTRAMUSCULAR; INTRAVENOUS AS NEEDED
Status: DISCONTINUED | OUTPATIENT
Start: 2021-12-16 | End: 2021-12-16

## 2021-12-16 RX ORDER — PROPOFOL 10 MG/ML
INJECTION, EMULSION INTRAVENOUS AS NEEDED
Status: DISCONTINUED | OUTPATIENT
Start: 2021-12-16 | End: 2021-12-16

## 2021-12-16 RX ORDER — LIDOCAINE HYDROCHLORIDE 10 MG/ML
INJECTION, SOLUTION EPIDURAL; INFILTRATION; INTRACAUDAL; PERINEURAL AS NEEDED
Status: DISCONTINUED | OUTPATIENT
Start: 2021-12-16 | End: 2021-12-16

## 2021-12-16 RX ORDER — SODIUM CHLORIDE 9 MG/ML
20 INJECTION, SOLUTION INTRAVENOUS CONTINUOUS
Status: DISCONTINUED | OUTPATIENT
Start: 2021-12-16 | End: 2021-12-20 | Stop reason: HOSPADM

## 2021-12-16 RX ORDER — METRONIDAZOLE 500 MG/1
500 TABLET ORAL EVERY 8 HOURS SCHEDULED
Qty: 15 TABLET | Refills: 0 | Status: SHIPPED | OUTPATIENT
Start: 2021-12-16 | End: 2021-12-21

## 2021-12-16 RX ORDER — CIPROFLOXACIN 500 MG/1
500 TABLET, FILM COATED ORAL EVERY 12 HOURS SCHEDULED
Qty: 10 TABLET | Refills: 0 | Status: SHIPPED | OUTPATIENT
Start: 2021-12-16 | End: 2021-12-21

## 2021-12-16 RX ORDER — HYDROCHLOROTHIAZIDE 25 MG/1
25 TABLET ORAL DAILY
COMMUNITY
Start: 2021-11-12 | End: 2022-11-12

## 2021-12-16 RX ADMIN — GLYCOPYRROLATE 0.1 MG: 0.2 INJECTION INTRAMUSCULAR; INTRAVENOUS at 10:35

## 2021-12-16 RX ADMIN — LIDOCAINE HYDROCHLORIDE 50 MG: 10 INJECTION, SOLUTION EPIDURAL; INFILTRATION; INTRACAUDAL; PERINEURAL at 10:37

## 2021-12-16 RX ADMIN — PROPOFOL 20 MG: 10 INJECTION, EMULSION INTRAVENOUS at 10:34

## 2021-12-16 RX ADMIN — PROPOFOL 50 MG: 10 INJECTION, EMULSION INTRAVENOUS at 10:39

## 2021-12-16 RX ADMIN — LIDOCAINE HYDROCHLORIDE 50 MG: 10 INJECTION, SOLUTION EPIDURAL; INFILTRATION; INTRACAUDAL; PERINEURAL at 10:35

## 2021-12-16 RX ADMIN — SODIUM CHLORIDE: 9 INJECTION, SOLUTION INTRAVENOUS at 10:30

## 2021-12-16 RX ADMIN — PROPOFOL 130 MG: 10 INJECTION, EMULSION INTRAVENOUS at 10:32

## 2021-12-16 RX ADMIN — PROPOFOL 50 MG: 10 INJECTION, EMULSION INTRAVENOUS at 10:37

## 2021-12-16 RX ADMIN — PROPOFOL 30 MG: 10 INJECTION, EMULSION INTRAVENOUS at 10:42

## 2021-12-16 RX ADMIN — PROPOFOL 20 MG: 10 INJECTION, EMULSION INTRAVENOUS at 10:45

## 2023-02-02 ENCOUNTER — OFFICE VISIT (OUTPATIENT)
Dept: GASTROENTEROLOGY | Facility: CLINIC | Age: 63
End: 2023-02-02

## 2023-02-02 VITALS
DIASTOLIC BLOOD PRESSURE: 100 MMHG | BODY MASS INDEX: 33.98 KG/M2 | WEIGHT: 191.8 LBS | SYSTOLIC BLOOD PRESSURE: 125 MMHG

## 2023-02-02 DIAGNOSIS — K64.1 GRADE II HEMORRHOIDS: ICD-10-CM

## 2023-02-02 DIAGNOSIS — K21.9 GASTROESOPHAGEAL REFLUX DISEASE WITHOUT ESOPHAGITIS: ICD-10-CM

## 2023-02-02 DIAGNOSIS — Z86.010 HISTORY OF COLON POLYPS: Primary | ICD-10-CM

## 2023-02-02 DIAGNOSIS — K57.90 DIVERTICULOSIS: ICD-10-CM

## 2023-02-02 NOTE — PROGRESS NOTES
[facility] - Outpatient Follow-up Note  Roxi Graham 60 y o  female MRN: [id number]  Encounter: [id number]          ASSESSMENT AND PLAN:      1  History of colon polyps    2  Diverticulosis    3  Gastroesophageal reflux disease without esophagitis    4  Grade II hemorrhoids      History of hemorrhoidal problems, colon polyp diverticulosis, GERD, dyspepsia, antireflux measures reviewed diet discussed, encouraged to eat smaller portion and lose some weight  Anoscopy was performed, grade 2 hemorrhoids right anterior right posterior, grade 1 left lateral   Anal Excision Procedures  Performed by: Alley Monzon MD  Authorized by: Alley Monzon MD     Universal Protocol:     Verbal consent obtained?: Yes      Written consent obtained?: Yes      Risks and benefits: Risks, benefits and alternatives were discussed      Consent given by:  Patient    Patient states understanding of procedure being performed: Yes      Patient identity confirmed:  Verbally with patient  A time out verifies correct patient, procedure, equipment, support staff and site/side marked as required:   Procedure Type: hemorrhoidectomy    Hemorrhoidectomy Details:   Hemorrhoid type: internal    Internal position:  Ten o'clock  Single rubber band ligation        ______________________________________________________________________    SUBJECTIVE:      Patient with history of occasional heartburn indigestion, has been having some hemorrhoidal issues, sometimes discomfort,, had a banding done 3 years ago never followed through  Colonoscopy couple years ago showed some diverticulosis polyp and hemorrhoids  Denies any chest pain shortness of breath fever chills rash coughing choking spells, has indigestion and occasional reflux bloating  History of sleeve  Denies any dysphagia, no history of CVA CAD CAD  Diet medications more than 10 system reviewed  REVIEW OF SYSTEMS IS OTHERWISE NEGATIVE        Historical Information   Past Medical History:   Diagnosis Date   • Aortic aneurysm (HCC)    • Arthritis     pelvic   • Chronic pain disorder     back  lower abd   • Colon polyp    • Diverticulitis    • GERD (gastroesophageal reflux disease)    • Hypertension      Past Surgical History:   Procedure Laterality Date   • ABDOMINAL SURGERY     • BREAST SURGERY      reduction   • CHOLECYSTECTOMY     • COLONOSCOPY     • HYSTERECTOMY       Social History   Social History     Substance and Sexual Activity   Alcohol Use Yes   • Alcohol/week: 1 0 standard drink   • Types: 1 Glasses of wine per week    Comment: 1 per day     Social History     Substance and Sexual Activity   Drug Use Yes   • Types: Marijuana    Comment: medical marijuana card     Social History     Tobacco Use   Smoking Status Never   Smokeless Tobacco Never     Family History   Problem Relation Age of Onset   • Hypertension Mother    • Breast cancer Mother    • Hypertension Father        Meds/Allergies       Current Outpatient Medications:   •  aspirin (ECOTRIN LOW STRENGTH) 81 mg EC tablet  •  aspirin-acetaminophen-caffeine (Excedrin Migraine) 250-250-65 MG per tablet  •  metoprolol succinate (TOPROL-XL) 50 mg 24 hr tablet  •  Multiple Vitamins-Minerals (multivitamin with minerals) tablet  •  hydrochlorothiazide (HYDRODIURIL) 25 mg tablet    Allergies   Allergen Reactions   • Latex Other (See Comments)     Skin irritation           Objective     Blood pressure 125/100, weight 87 kg (191 lb 12 8 oz)  Body mass index is 33 98 kg/m²  PHYSICAL EXAM:      General Appearance:   Alert, cooperative, no distress   HEENT:   Normocephalic, atraumatic, anicteric  Neck:  Supple, symmetrical, trachea midline   Lungs:   Clear to auscultation bilaterally; no rales, rhonchi or wheezing; respirations unlabored    Heart[de-identified]   Regular rate and rhythm; no murmur     Abdomen:   Soft, non-tender, non-distended; normal bowel sounds; no masses, no organomegaly    Genitalia:   Deferred Rectal:    As above, no masses  Extremities:  No cyanosis, clubbing or edema    Skin:  No jaundice, rashes, or lesions    Lymph nodes:  No palpable cervical lymphadenopathy        Lab Results:   No visits with results within 1 Day(s) from this visit     Latest known visit with results is:   Admission on 02/16/2021, Discharged on 02/17/2021   Component Date Value   • WBC 02/16/2021 6 34    • RBC 02/16/2021 4 56    • Hemoglobin 02/16/2021 13 1    • Hematocrit 02/16/2021 38 5    • MCV 02/16/2021 84    • MCH 02/16/2021 28 7    • MCHC 02/16/2021 34 0    • RDW 02/16/2021 13 4    • MPV 02/16/2021 10 1    • Platelets 37/06/6947 191    • Neutrophils Relative 02/16/2021 84 (H)    • Immat GRANS % 02/16/2021 0    • Lymphocytes Relative 02/16/2021 10 (L)    • Monocytes Relative 02/16/2021 6    • Eosinophils Relative 02/16/2021 0    • Basophils Relative 02/16/2021 0    • Neutrophils Absolute 02/16/2021 5 25    • Immature Grans Absolute 02/16/2021 0 01    • Lymphocytes Absolute 02/16/2021 0 65    • Monocytes Absolute 02/16/2021 0 39    • Eosinophils Absolute 02/16/2021 0 02    • Basophils Absolute 02/16/2021 0 02    • Sodium 02/16/2021 135    • Potassium 02/16/2021 3 4 (L)    • Chloride 02/16/2021 103    • CO2 02/16/2021 23    • ANION GAP 02/16/2021 9    • BUN 02/16/2021 14    • Creatinine 02/16/2021 0 67    • Glucose 02/16/2021 102    • Calcium 02/16/2021 8 8    • AST 02/16/2021 15    • ALT 02/16/2021 9    • Alkaline Phosphatase 02/16/2021 50 8    • Total Protein 02/16/2021 6 7    • Albumin 02/16/2021 4 2    • Total Bilirubin 02/16/2021 0 52    • eGFR 02/16/2021 95    • Protime 02/16/2021 11 0    • INR 02/16/2021 0 97    • PTT 02/16/2021 25    • Troponin I 02/16/2021 <0 03    • D-Dimer, Quant  02/16/2021 0 46    • WBC 02/17/2021 4 68    • RBC 02/17/2021 4 59    • Hemoglobin 02/17/2021 13 0    • Hematocrit 02/17/2021 39 5    • MCV 02/17/2021 86    • MCH 02/17/2021 28 3    • MCHC 02/17/2021 32 9    • RDW 02/17/2021 13 7    • MPV 02/17/2021 9 9    • Platelets 31/13/7085 210    • Neutrophils Relative 02/17/2021 62    • Immat GRANS % 02/17/2021 0    • Lymphocytes Relative 02/17/2021 28    • Monocytes Relative 02/17/2021 8    • Eosinophils Relative 02/17/2021 2    • Basophils Relative 02/17/2021 0    • Neutrophils Absolute 02/17/2021 2 89    • Immature Grans Absolute 02/17/2021 0 00    • Lymphocytes Absolute 02/17/2021 1 31    • Monocytes Absolute 02/17/2021 0 39    • Eosinophils Absolute 02/17/2021 0 08    • Basophils Absolute 02/17/2021 0 01    • Sodium 02/17/2021 141    • Potassium 02/17/2021 3 5    • Chloride 02/17/2021 106    • CO2 02/17/2021 27    • ANION GAP 02/17/2021 8    • BUN 02/17/2021 9    • Creatinine 02/17/2021 0 67    • Glucose 02/17/2021 90    • Glucose, Fasting 02/17/2021 90    • Calcium 02/17/2021 8 7    • eGFR 02/17/2021 95    • TSH 3RD GENERATON 02/17/2021 1 713    • Ventricular Rate 02/16/2021 56    • Atrial Rate 02/16/2021 56    • NC Interval 02/16/2021 132    • QRSD Interval 02/16/2021 105    • QT Interval 02/16/2021 493    • QTC Interval 02/16/2021 476    • P Axis 02/16/2021 29    • QRS Axis 02/16/2021 11    • T Wave Axis 02/16/2021 29          Radiology Results:   No results found

## 2023-03-16 ENCOUNTER — OFFICE VISIT (OUTPATIENT)
Dept: GASTROENTEROLOGY | Facility: CLINIC | Age: 63
End: 2023-03-16

## 2023-03-16 VITALS
DIASTOLIC BLOOD PRESSURE: 111 MMHG | HEIGHT: 63 IN | BODY MASS INDEX: 33.66 KG/M2 | SYSTOLIC BLOOD PRESSURE: 150 MMHG | HEART RATE: 75 BPM | WEIGHT: 190 LBS

## 2023-03-16 DIAGNOSIS — K64.1 GRADE II HEMORRHOIDS: Primary | ICD-10-CM

## 2023-03-16 NOTE — PROGRESS NOTES
Anal Excision Procedures  Performed by: Andrews Valdovinos MD  Authorized by: Andrews Valdovinos MD     Universal Protocol:     Verbal consent obtained?: Yes      Written consent obtained?: Yes      Risks and benefits: Risks, benefits and alternatives were discussed      Consent given by:  Patient    Patient states understanding of procedure being performed: Yes      Patient identity confirmed:  Verbally with patient  A time out verifies correct patient, procedure, equipment, support staff and site/side marked as required:   Procedure Type: hemorrhoidectomy    Hemorrhoidectomy Details:   Hemorrhoid type: internal    Internal position:  Seven o'clock and nine o'clock  Single rubber band ligation      2 rubber bands were placed at the 7 o clock and 9 o clock positions  Answers for HPI/ROS submitted by the patient on 3/16/2023  Chronicity: new  Onset: 1 to 4 weeks ago  Onset quality: gradual  Frequency: intermittently  Episode duration: 24 Hours  Progression since onset: resolved  Pain location: LLQ  Pain - numeric: 1/10  Pain quality: a sensation of fullness  Radiates to: does not radiate  anorexia: No  arthralgias: No  belching: Yes  constipation: Yes  diarrhea: Yes  dysuria: No  fever: No  flatus: Yes  frequency: No  headaches: No  hematochezia: Yes  hematuria: No  melena: No  myalgias: No  nausea:  No  weight loss: No  vomiting: No  Aggravated by: being still, eating  Relieved by: belching, bowel movements, eating, movement, passing flatus

## 2023-03-24 ENCOUNTER — TELEPHONE (OUTPATIENT)
Dept: OTHER | Facility: OTHER | Age: 63
End: 2023-03-24

## 2023-03-24 NOTE — TELEPHONE ENCOUNTER
SPOKE WITH PT, HAD BANDING ON 3/16/23, STATES THE BANDS CAME OFF WITHIN 24HRS, I SCHEDULED HER FOR 3/30/23

## 2023-03-24 NOTE — TELEPHONE ENCOUNTER
Patient called today regarding Hemorrhoid Bandage came out  Patient would like to be seen as soon as possible  Please call patient back today  Patient states if no answer please leave a message

## 2023-03-30 ENCOUNTER — OFFICE VISIT (OUTPATIENT)
Dept: GASTROENTEROLOGY | Facility: CLINIC | Age: 63
End: 2023-03-30

## 2023-03-30 VITALS — HEIGHT: 63 IN | BODY MASS INDEX: 33.13 KG/M2 | WEIGHT: 187 LBS | HEART RATE: 74 BPM

## 2023-03-30 DIAGNOSIS — K57.90 DIVERTICULOSIS: ICD-10-CM

## 2023-03-30 DIAGNOSIS — Z86.010 HISTORY OF COLON POLYPS: ICD-10-CM

## 2023-03-30 DIAGNOSIS — K64.1 GRADE II HEMORRHOIDS: Primary | ICD-10-CM

## 2023-03-30 DIAGNOSIS — K21.9 GASTROESOPHAGEAL REFLUX DISEASE WITHOUT ESOPHAGITIS: ICD-10-CM

## 2023-03-30 NOTE — PROGRESS NOTES
[facility] - Outpatient Follow-up Note  Eulogio Anne 60 y o  female MRN: [id number]  Encounter: [id number]          ASSESSMENT AND PLAN:      1  Grade II hemorrhoids  -     Anal Excision Procedures    2  Gastroesophageal reflux disease without esophagitis    3  History of colon polyps  -     Anal Excision Procedures    4  Diverticulosis  -     Anal Excision Procedures    History of hemorrhoids, polyps and diverticulosis  Anoscopy performed, grade 2 hemorrhoid left lateral position, another hemorrhoidal tissue seen anteriorly  No significant hemorrhoids on the right side  Advised patient to take fiber supplements stool softeners avoid straining with the stools  She does have some external hemorrhoidal tissue as well  Unless she has any significant bleeding or other hemorrhoidal symptoms no further intervention suggested or plan at this time  Anal Excision Procedures  Performed by: Leticia Morrison MD  Authorized by: Leticia Morrison MD     Universal Protocol:     Verbal consent obtained?: Yes      Written consent obtained?: Yes      Risks and benefits: Risks, benefits and alternatives were discussed      Consent given by:  Patient    Patient states understanding of procedure being performed: Yes      Patient identity confirmed:  Verbally with patient  A time out verifies correct patient, procedure, equipment, support staff and site/side marked as required:   Procedure Type: hemorrhoidectomy    Hemorrhoidectomy Details:   Hemorrhoid type: internal    Internal position: Three o'clock and twelve o'clock  Single rubber band ligation      2 rubber bands were placed as above  Satisfactory position  ______________________________________________________________________    SUBJECTIVE:      Patient came for follow-up of her recent hemorrhoidal banding, she thought of band could have fallen off few days after the procedure    Denies any blood in stools bowels are irregular sometimes "strains, denies abdominal pain nausea vomiting dysphagia upper GI symptoms no GI bleeding chest pain shortness of breath  Some of the current and prior records noted  Diet medications more than 10 systems reviewed  REVIEW OF SYSTEMS IS OTHERWISE NEGATIVE  Historical Information   Past Medical History:   Diagnosis Date   • Aortic aneurysm (HCC)    • Arthritis     pelvic   • Chronic pain disorder     back  lower abd   • Colon polyp    • Diverticulitis    • GERD (gastroesophageal reflux disease)    • Hypertension      Past Surgical History:   Procedure Laterality Date   • ABDOMINAL SURGERY     • BREAST SURGERY      reduction   • CHOLECYSTECTOMY     • COLONOSCOPY     • HYSTERECTOMY       Social History   Social History     Substance and Sexual Activity   Alcohol Use Yes   • Alcohol/week: 1 0 standard drink   • Types: 1 Glasses of wine per week    Comment: 1 per day     Social History     Substance and Sexual Activity   Drug Use Yes   • Types: Marijuana    Comment: medical marijuana card     Social History     Tobacco Use   Smoking Status Never   Smokeless Tobacco Never     Family History   Problem Relation Age of Onset   • Hypertension Mother    • Breast cancer Mother    • Hypertension Father        Meds/Allergies       Current Outpatient Medications:   •  aspirin (ECOTRIN LOW STRENGTH) 81 mg EC tablet  •  aspirin-acetaminophen-caffeine (Excedrin Migraine) 250-250-65 MG per tablet  •  metoprolol succinate (TOPROL-XL) 50 mg 24 hr tablet  •  Multiple Vitamins-Minerals (multivitamin with minerals) tablet  •  hydrochlorothiazide (HYDRODIURIL) 25 mg tablet    Allergies   Allergen Reactions   • Latex Other (See Comments)     Skin irritation           Objective     Pulse 74, height 5' 3\" (1 6 m), weight 84 8 kg (187 lb)  Body mass index is 33 13 kg/m²  PHYSICAL EXAM:      General Appearance:   Alert, cooperative, no distress   HEENT:   Normocephalic, atraumatic, anicteric       Neck:  Supple, symmetrical, " trachea midline   Lungs:   Clear to auscultation bilaterally; no rales, rhonchi or wheezing; respirations unlabored    Heart[de-identified]   Regular rate and rhythm; no murmur  Abdomen:   Soft, non-tender, non-distended; normal bowel sounds; no masses, no organomegaly    Genitalia:   Deferred    Rectal:   Deferred    Extremities:  No cyanosis, clubbing or edema    Skin:  No jaundice, rashes, or lesions    Lymph nodes:  No palpable cervical lymphadenopathy        Lab Results:   No visits with results within 1 Day(s) from this visit     Latest known visit with results is:   Admission on 02/16/2021, Discharged on 02/17/2021   Component Date Value   • WBC 02/16/2021 6 34    • RBC 02/16/2021 4 56    • Hemoglobin 02/16/2021 13 1    • Hematocrit 02/16/2021 38 5    • MCV 02/16/2021 84    • MCH 02/16/2021 28 7    • MCHC 02/16/2021 34 0    • RDW 02/16/2021 13 4    • MPV 02/16/2021 10 1    • Platelets 78/84/0585 191    • Neutrophils Relative 02/16/2021 84 (H)    • Immat GRANS % 02/16/2021 0    • Lymphocytes Relative 02/16/2021 10 (L)    • Monocytes Relative 02/16/2021 6    • Eosinophils Relative 02/16/2021 0    • Basophils Relative 02/16/2021 0    • Neutrophils Absolute 02/16/2021 5 25    • Immature Grans Absolute 02/16/2021 0 01    • Lymphocytes Absolute 02/16/2021 0 65    • Monocytes Absolute 02/16/2021 0 39    • Eosinophils Absolute 02/16/2021 0 02    • Basophils Absolute 02/16/2021 0 02    • Sodium 02/16/2021 135    • Potassium 02/16/2021 3 4 (L)    • Chloride 02/16/2021 103    • CO2 02/16/2021 23    • ANION GAP 02/16/2021 9    • BUN 02/16/2021 14    • Creatinine 02/16/2021 0 67    • Glucose 02/16/2021 102    • Calcium 02/16/2021 8 8    • AST 02/16/2021 15    • ALT 02/16/2021 9    • Alkaline Phosphatase 02/16/2021 50 8    • Total Protein 02/16/2021 6 7    • Albumin 02/16/2021 4 2    • Total Bilirubin 02/16/2021 0 52    • eGFR 02/16/2021 95    • Protime 02/16/2021 11 0    • INR 02/16/2021 0 97    • PTT 02/16/2021 25    • Troponin I 02/16/2021 <0 03    • D-Dimer, Quant  02/16/2021 0 46    • WBC 02/17/2021 4 68    • RBC 02/17/2021 4 59    • Hemoglobin 02/17/2021 13 0    • Hematocrit 02/17/2021 39 5    • MCV 02/17/2021 86    • MCH 02/17/2021 28 3    • MCHC 02/17/2021 32 9    • RDW 02/17/2021 13 7    • MPV 02/17/2021 9 9    • Platelets 82/67/0954 210    • Neutrophils Relative 02/17/2021 62    • Immat GRANS % 02/17/2021 0    • Lymphocytes Relative 02/17/2021 28    • Monocytes Relative 02/17/2021 8    • Eosinophils Relative 02/17/2021 2    • Basophils Relative 02/17/2021 0    • Neutrophils Absolute 02/17/2021 2 89    • Immature Grans Absolute 02/17/2021 0 00    • Lymphocytes Absolute 02/17/2021 1 31    • Monocytes Absolute 02/17/2021 0 39    • Eosinophils Absolute 02/17/2021 0 08    • Basophils Absolute 02/17/2021 0 01    • Sodium 02/17/2021 141    • Potassium 02/17/2021 3 5    • Chloride 02/17/2021 106    • CO2 02/17/2021 27    • ANION GAP 02/17/2021 8    • BUN 02/17/2021 9    • Creatinine 02/17/2021 0 67    • Glucose 02/17/2021 90    • Glucose, Fasting 02/17/2021 90    • Calcium 02/17/2021 8 7    • eGFR 02/17/2021 95    • TSH 3RD GENERATON 02/17/2021 1 713    • Ventricular Rate 02/16/2021 56    • Atrial Rate 02/16/2021 56    • CA Interval 02/16/2021 132    • QRSD Interval 02/16/2021 105    • QT Interval 02/16/2021 493    • QTC Interval 02/16/2021 476    • P Axis 02/16/2021 29    • QRS Axis 02/16/2021 11    • T Wave Axis 02/16/2021 29          Radiology Results:   No results found

## 2023-04-17 ENCOUNTER — APPOINTMENT (EMERGENCY)
Dept: CT IMAGING | Facility: HOSPITAL | Age: 63
End: 2023-04-17

## 2023-04-17 ENCOUNTER — APPOINTMENT (OUTPATIENT)
Dept: GASTROENTEROLOGY | Facility: HOSPITAL | Age: 63
End: 2023-04-17

## 2023-04-17 ENCOUNTER — HOSPITAL ENCOUNTER (OUTPATIENT)
Facility: HOSPITAL | Age: 63
Setting detail: OBSERVATION
Discharge: HOME | End: 2023-04-18
Attending: EMERGENCY MEDICINE | Admitting: INTERNAL MEDICINE

## 2023-04-17 DIAGNOSIS — K92.2 GASTROINTESTINAL HEMORRHAGE, UNSPECIFIED GASTROINTESTINAL HEMORRHAGE TYPE: ICD-10-CM

## 2023-04-17 DIAGNOSIS — K62.5 RECTAL BLEEDING: Primary | ICD-10-CM

## 2023-04-17 DIAGNOSIS — R10.32 LLQ ABDOMINAL PAIN: ICD-10-CM

## 2023-04-17 LAB
ABO GROUP BLD: NORMAL
ALBUMIN SERPL BCP-MCNC: 4 G/DL (ref 3.5–5)
ALP SERPL-CCNC: 64 U/L (ref 34–104)
ALT SERPL W P-5'-P-CCNC: 16 U/L (ref 7–52)
ANION GAP SERPL CALCULATED.3IONS-SCNC: 8 MMOL/L (ref 4–13)
ANION GAP SERPL CALCULATED.3IONS-SCNC: 8 MMOL/L (ref 4–13)
APTT PPP: 31 SECONDS (ref 23–37)
AST SERPL W P-5'-P-CCNC: 17 U/L (ref 13–39)
BASOPHILS # BLD AUTO: 0.02 THOUSANDS/ΜL (ref 0–0.1)
BASOPHILS # BLD AUTO: 0.03 THOUSANDS/ΜL (ref 0–0.1)
BASOPHILS NFR BLD AUTO: 1 % (ref 0–1)
BASOPHILS NFR BLD AUTO: 1 % (ref 0–1)
BILIRUB DIRECT SERPL-MCNC: 0.12 MG/DL (ref 0–0.2)
BILIRUB SERPL-MCNC: 0.49 MG/DL (ref 0.2–1)
BLD GP AB SCN SERPL QL: NEGATIVE
BUN SERPL-MCNC: 13 MG/DL (ref 5–25)
BUN SERPL-MCNC: 17 MG/DL (ref 5–25)
CALCIUM SERPL-MCNC: 8.1 MG/DL (ref 8.4–10.2)
CALCIUM SERPL-MCNC: 8.7 MG/DL (ref 8.4–10.2)
CHLORIDE SERPL-SCNC: 106 MMOL/L (ref 96–108)
CHLORIDE SERPL-SCNC: 107 MMOL/L (ref 96–108)
CO2 SERPL-SCNC: 24 MMOL/L (ref 21–32)
CO2 SERPL-SCNC: 26 MMOL/L (ref 21–32)
CREAT SERPL-MCNC: 0.54 MG/DL (ref 0.6–1.3)
CREAT SERPL-MCNC: 0.65 MG/DL (ref 0.6–1.3)
EOSINOPHIL # BLD AUTO: 0.12 THOUSAND/ΜL (ref 0–0.61)
EOSINOPHIL # BLD AUTO: 0.14 THOUSAND/ΜL (ref 0–0.61)
EOSINOPHIL NFR BLD AUTO: 3 % (ref 0–6)
EOSINOPHIL NFR BLD AUTO: 4 % (ref 0–6)
ERYTHROCYTE [DISTWIDTH] IN BLOOD BY AUTOMATED COUNT: 13.2 % (ref 11.6–15.1)
ERYTHROCYTE [DISTWIDTH] IN BLOOD BY AUTOMATED COUNT: 13.3 % (ref 11.6–15.1)
GFR SERPL CREATININE-BSD FRML MDRD: 100 ML/MIN/1.73SQ M
GFR SERPL CREATININE-BSD FRML MDRD: 94 ML/MIN/1.73SQ M
GLUCOSE P FAST SERPL-MCNC: 89 MG/DL (ref 65–99)
GLUCOSE SERPL-MCNC: 89 MG/DL (ref 65–140)
GLUCOSE SERPL-MCNC: 91 MG/DL (ref 65–140)
HCT VFR BLD AUTO: 33.1 % (ref 34.8–46.1)
HCT VFR BLD AUTO: 33.6 % (ref 34.8–46.1)
HCT VFR BLD AUTO: 37 % (ref 34.8–46.1)
HEMOCCULT STL QL IA: POSITIVE
HGB BLD-MCNC: 11 G/DL (ref 11.5–15.4)
HGB BLD-MCNC: 11.2 G/DL (ref 11.5–15.4)
HGB BLD-MCNC: 12.2 G/DL (ref 11.5–15.4)
IMM GRANULOCYTES # BLD AUTO: 0.01 THOUSAND/UL (ref 0–0.2)
IMM GRANULOCYTES # BLD AUTO: 0.03 THOUSAND/UL (ref 0–0.2)
IMM GRANULOCYTES NFR BLD AUTO: 0 % (ref 0–2)
IMM GRANULOCYTES NFR BLD AUTO: 1 % (ref 0–2)
INR PPP: 0.96 (ref 0.84–1.19)
LIPASE SERPL-CCNC: 22 U/L (ref 11–82)
LYMPHOCYTES # BLD AUTO: 0.92 THOUSANDS/ΜL (ref 0.6–4.47)
LYMPHOCYTES # BLD AUTO: 1.31 THOUSANDS/ΜL (ref 0.6–4.47)
LYMPHOCYTES NFR BLD AUTO: 28 % (ref 14–44)
LYMPHOCYTES NFR BLD AUTO: 31 % (ref 14–44)
MCH RBC QN AUTO: 27.9 PG (ref 26.8–34.3)
MCH RBC QN AUTO: 28.2 PG (ref 26.8–34.3)
MCHC RBC AUTO-ENTMCNC: 33 G/DL (ref 31.4–37.4)
MCHC RBC AUTO-ENTMCNC: 33.2 G/DL (ref 31.4–37.4)
MCV RBC AUTO: 85 FL (ref 82–98)
MCV RBC AUTO: 85 FL (ref 82–98)
MONOCYTES # BLD AUTO: 0.35 THOUSAND/ΜL (ref 0.17–1.22)
MONOCYTES # BLD AUTO: 0.44 THOUSAND/ΜL (ref 0.17–1.22)
MONOCYTES NFR BLD AUTO: 11 % (ref 4–12)
MONOCYTES NFR BLD AUTO: 11 % (ref 4–12)
NEUTROPHILS # BLD AUTO: 1.91 THOUSANDS/ΜL (ref 1.85–7.62)
NEUTROPHILS # BLD AUTO: 2.23 THOUSANDS/ΜL (ref 1.85–7.62)
NEUTS SEG NFR BLD AUTO: 53 % (ref 43–75)
NEUTS SEG NFR BLD AUTO: 56 % (ref 43–75)
NRBC BLD AUTO-RTO: 0 /100 WBCS
NRBC BLD AUTO-RTO: 0 /100 WBCS
PLATELET # BLD AUTO: 154 THOUSANDS/UL (ref 149–390)
PLATELET # BLD AUTO: 182 THOUSANDS/UL (ref 149–390)
PMV BLD AUTO: 9.7 FL (ref 8.9–12.7)
PMV BLD AUTO: 9.9 FL (ref 8.9–12.7)
POTASSIUM SERPL-SCNC: 3.5 MMOL/L (ref 3.5–5.3)
POTASSIUM SERPL-SCNC: 4 MMOL/L (ref 3.5–5.3)
PROT SERPL-MCNC: 6.6 G/DL (ref 6.4–8.4)
PROTHROMBIN TIME: 13.1 SECONDS (ref 11.6–14.5)
RBC # BLD AUTO: 3.9 MILLION/UL (ref 3.81–5.12)
RBC # BLD AUTO: 4.37 MILLION/UL (ref 3.81–5.12)
RH BLD: POSITIVE
SODIUM SERPL-SCNC: 139 MMOL/L (ref 135–147)
SODIUM SERPL-SCNC: 140 MMOL/L (ref 135–147)
SPECIMEN EXPIRATION DATE: NORMAL
WBC # BLD AUTO: 3.34 THOUSAND/UL (ref 4.31–10.16)
WBC # BLD AUTO: 4.17 THOUSAND/UL (ref 4.31–10.16)

## 2023-04-17 RX ORDER — TRAZODONE HYDROCHLORIDE 50 MG/1
50 TABLET ORAL
Status: DISCONTINUED | OUTPATIENT
Start: 2023-04-17 | End: 2023-04-18 | Stop reason: HOSPADM

## 2023-04-17 RX ORDER — LANOLIN ALCOHOL/MO/W.PET/CERES
3 CREAM (GRAM) TOPICAL
Status: DISCONTINUED | OUTPATIENT
Start: 2023-04-17 | End: 2023-04-17

## 2023-04-17 RX ORDER — ACETAMINOPHEN 325 MG/1
975 TABLET ORAL ONCE
Status: COMPLETED | OUTPATIENT
Start: 2023-04-17 | End: 2023-04-17

## 2023-04-17 RX ORDER — SODIUM CHLORIDE, SODIUM LACTATE, POTASSIUM CHLORIDE, CALCIUM CHLORIDE 600; 310; 30; 20 MG/100ML; MG/100ML; MG/100ML; MG/100ML
75 INJECTION, SOLUTION INTRAVENOUS CONTINUOUS
Status: DISCONTINUED | OUTPATIENT
Start: 2023-04-17 | End: 2023-04-18 | Stop reason: HOSPADM

## 2023-04-17 RX ORDER — NAPROXEN 500 MG/1
500 TABLET ORAL 2 TIMES DAILY WITH MEALS
COMMUNITY

## 2023-04-17 RX ORDER — MORPHINE SULFATE 4 MG/ML
4 INJECTION, SOLUTION INTRAMUSCULAR; INTRAVENOUS ONCE
Status: DISCONTINUED | OUTPATIENT
Start: 2023-04-17 | End: 2023-04-17

## 2023-04-17 RX ORDER — ACETAMINOPHEN 325 MG/1
650 TABLET ORAL EVERY 6 HOURS PRN
Status: DISCONTINUED | OUTPATIENT
Start: 2023-04-17 | End: 2023-04-18 | Stop reason: HOSPADM

## 2023-04-17 RX ORDER — PANTOPRAZOLE SODIUM 40 MG/10ML
40 INJECTION, POWDER, LYOPHILIZED, FOR SOLUTION INTRAVENOUS EVERY 12 HOURS SCHEDULED
Status: DISCONTINUED | OUTPATIENT
Start: 2023-04-17 | End: 2023-04-18 | Stop reason: HOSPADM

## 2023-04-17 RX ORDER — OXYCODONE HYDROCHLORIDE 5 MG/1
5 TABLET ORAL EVERY 4 HOURS PRN
Status: DISCONTINUED | OUTPATIENT
Start: 2023-04-17 | End: 2023-04-18 | Stop reason: HOSPADM

## 2023-04-17 RX ORDER — METOPROLOL SUCCINATE 50 MG/1
50 TABLET, EXTENDED RELEASE ORAL DAILY
Status: DISCONTINUED | OUTPATIENT
Start: 2023-04-17 | End: 2023-04-18 | Stop reason: HOSPADM

## 2023-04-17 RX ORDER — HYDROCHLOROTHIAZIDE 25 MG/1
25 TABLET ORAL DAILY
Status: DISCONTINUED | OUTPATIENT
Start: 2023-04-17 | End: 2023-04-18 | Stop reason: HOSPADM

## 2023-04-17 RX ADMIN — MORPHINE SULFATE 2 MG: 2 INJECTION, SOLUTION INTRAMUSCULAR; INTRAVENOUS at 03:05

## 2023-04-17 RX ADMIN — OXYCODONE HYDROCHLORIDE 5 MG: 5 TABLET ORAL at 21:07

## 2023-04-17 RX ADMIN — SODIUM CHLORIDE, SODIUM LACTATE, POTASSIUM CHLORIDE, AND CALCIUM CHLORIDE 75 ML/HR: .6; .31; .03; .02 INJECTION, SOLUTION INTRAVENOUS at 05:33

## 2023-04-17 RX ADMIN — OXYCODONE HYDROCHLORIDE 5 MG: 5 TABLET ORAL at 10:09

## 2023-04-17 RX ADMIN — PANTOPRAZOLE SODIUM 40 MG: 40 INJECTION, POWDER, FOR SOLUTION INTRAVENOUS at 09:03

## 2023-04-17 RX ADMIN — METOPROLOL SUCCINATE 50 MG: 50 TABLET, EXTENDED RELEASE ORAL at 09:03

## 2023-04-17 RX ADMIN — PANTOPRAZOLE SODIUM 40 MG: 40 INJECTION, POWDER, FOR SOLUTION INTRAVENOUS at 21:07

## 2023-04-17 RX ADMIN — IOHEXOL 100 ML: 350 INJECTION, SOLUTION INTRAVENOUS at 02:31

## 2023-04-17 RX ADMIN — TRAZODONE HYDROCHLORIDE 50 MG: 50 TABLET ORAL at 21:07

## 2023-04-17 RX ADMIN — HYDROCHLOROTHIAZIDE 25 MG: 25 TABLET ORAL at 09:03

## 2023-04-17 RX ADMIN — ACETAMINOPHEN 975 MG: 325 TABLET ORAL at 02:11

## 2023-04-17 NOTE — ASSESSMENT & PLAN NOTE
Presents due passing large amounts of dark clots per rectum x 6 tonight  None since in ED  Reports last BM 4/15 and denies dark stool or hematchezia at that time  No vomiting, hematemesis  Associated generalized abdominal pain, worse in LLQ pain  Notes has been taking Naproxen over the past 4 days due to knee pain  · Hx of diverticulosis, GERD, hemorrhoids s/p recent rubber band ligation 3/30 w/ Dr Veronica May   · CT a/p wo acute abnormality   · VSS  No tachycardia  Cr/BUN wnl  · Hgb 12 2  · Trend H&H  · Hold ASA  · IV Protonix b i d    · Check stool PCR  · Stool record  · GI consult appreciated

## 2023-04-17 NOTE — H&P
[facility]  H&P  Name: Robert Colon 60 y o  female I MRN: [id number]  Unit/Bed#: -01 I Date of Admission: 4/17/2023   Date of Service: 4/17/2023 I Hospital Day: 0      Assessment/Plan   * GI bleed  Assessment & Plan  Presents due passing large amounts of dark clots per rectum x 6 tonight  None since in ED  Reports last BM 4/15 and denies dark stool or hematchezia at that time  No vomiting, hematemesis  Associated generalized abdominal pain, worse in LLQ pain  Notes has been taking Naproxen over the past 4 days due to knee pain  · Hx of diverticulosis, GERD, hemorrhoids s/p recent rubber band ligation 3/30 w/ Dr Alec Ny   · CT a/p wo acute abnormality   · VSS  No tachycardia  Cr/BUN wnl  · Hgb 12 2  · Trend H&H  · Hold ASA  · IV Protonix b i d  · Check stool PCR  · Stool record  · GI consult appreciated     Essential hypertension  Assessment & Plan  · Above goal  · Continue metoprolol 50mg q d , HCTZ 25mg q d  VTE Pharmacologic Prophylaxis: VTE Score: 3 contraindicated, SCDs ordered  Code Status: Level 1 - Full Code   Discussion with family: Patient declined call to   Anticipated Length of Stay: Patient will be admitted on an observation basis with an anticipated length of stay of less than 2 midnights secondary to rectal bleeding, hematachezia  Total Time Spent on Date of Encounter in care of patient: 40 minutes This time was spent on one or more of the following: performing physical exam; counseling and coordination of care; obtaining or reviewing history; documenting in the medical record; reviewing/ordering tests, medications or procedures; communicating with other healthcare professionals and discussing with patient's family/caregivers      Chief Complaint: passing clots per rectum     History of Present Illness:  Robert Colon is a 60 y o  female with a PMH of hemorrhoids, diverticulosis, HTN who presents with due to 6 episodes of passing large amounts of dark clots per rectum tonight  Associated with generalized abdominal pain, worse in left lower quadrant  Reports that she has not had a normal bowel movement since Saturday morning  Typically goes 2-3x per day  States BMs have been smaller and pebble-like  Denies dark stools, hematochezia, hematemesis, rectal pain  Reports a history of hemorrhoids and does have scant bright red blood when wiping with toilet tissue  States she has never passed clots like this before in the past   Reports recent rubber band ligation 3/30  She also states that over the past 3 to 4 days she has been taking naproxen for knee pain/swelling  Reports she has had a colonoscopy in the past revealing diverticulosis and internal hemorrhoids  Does note history of EGD, unable to find results  States that she does have reflux symptoms frequently  Otherwise she denies any fevers, chills, chest pain, shortness of breath, urinary complaints  In ED, underwent CT abdomen pelvis which was negative for acute findings  Hemoglobin 12 2  Creatinine 0 65, BUN 17  Patient remained hemodynamically stable and did not have any further episodes of bleeding  Admitted for further evaluation and treatment with plan for GI consult  Review of Systems:  Review of Systems   Constitutional: Negative for chills and fever  HENT: Negative for congestion  Respiratory: Negative for shortness of breath  Cardiovascular: Negative for chest pain  Gastrointestinal: Positive for abdominal pain, anal bleeding and blood in stool  Negative for abdominal distention, diarrhea, nausea and vomiting  Genitourinary: Negative for difficulty urinating  Musculoskeletal: Negative for myalgias  Skin: Negative for rash  Neurological: Negative for dizziness, weakness and light-headedness         Past Medical and Surgical History:   Past Medical History:   Diagnosis Date   • Aortic aneurysm [facility])    • Arthritis     pelvic   • Chronic pain disorder back  lower abd   • Colon polyp    • Diverticulitis    • GERD (gastroesophageal reflux disease)    • Hypertension        Past Surgical History:   Procedure Laterality Date   • ABDOMINAL SURGERY     • BREAST SURGERY      reduction   • CHOLECYSTECTOMY     • COLONOSCOPY     • HYSTERECTOMY         Meds/Allergies:  Prior to Admission medications    Medication Sig Start Date End Date Taking? Authorizing Provider   aspirin (ECOTRIN LOW STRENGTH) 81 mg EC tablet Take 81 mg by mouth daily Every other day   Yes Historical Provider, MD   aspirin-acetaminophen-caffeine (Excedrin Migraine) 250-250-65 MG per tablet TAKE 1 TABLET 3 TIMES DAILY AS NEEDED   Yes Historical Provider, MD   metoprolol succinate (TOPROL-XL) 50 mg 24 hr tablet Take 50 mg by mouth daily   Yes Historical Provider, MD   Multiple Vitamins-Minerals (multivitamin with minerals) tablet Take 1 tablet by mouth daily   Yes Historical Provider, MD   hydrochlorothiazide (HYDRODIURIL) 25 mg tablet Take 25 mg by mouth daily 11/12/21 3/16/23  Historical Provider, MD     I have reviewed home medications with patient personally  Allergies:    Allergies   Allergen Reactions   • Chlorhexidine Hives   • Latex Other (See Comments)     Skin irritation   • Lisinopril Cough       Social History:  Marital Status:    Occupation:   Patient Pre-hospital Living Situation: Home  Patient Pre-hospital Level of Mobility: walks  Patient Pre-hospital Diet Restrictions:   Substance Use History:   Social History     Substance and Sexual Activity   Alcohol Use Yes   • Alcohol/week: 1 0 standard drink   • Types: 1 Glasses of wine per week    Comment: 1 per day     Social History     Tobacco Use   Smoking Status Never   Smokeless Tobacco Never     Social History     Substance and Sexual Activity   Drug Use Yes   • Types: Marijuana    Comment: medical marijuana card       Family History:  Family History   Problem Relation Age of Onset   • Hypertension Mother    • Breast cancer Mother "  • Hypertension Father        Physical Exam:     Vitals:   Blood Pressure: 150/84 (04/17/23 0354)  Pulse: 67 (04/17/23 0354)  Temperature: 97 5 °F (36 4 °C) (04/17/23 0354)  Temp Source: Oral (04/17/23 0354)  Respirations: 17 (04/17/23 0354)  Height: 5' 3\" (160 cm) (04/17/23 0110)  Weight - Scale: 85 8 kg (189 lb 3 2 oz) (04/17/23 0354)  SpO2: 97 % (04/17/23 0354)    Physical Exam  Vitals reviewed  Constitutional:       General: She is not in acute distress  HENT:      Head: Normocephalic and atraumatic  Mouth/Throat:      Mouth: Mucous membranes are moist    Cardiovascular:      Rate and Rhythm: Normal rate and regular rhythm  Pulses: Normal pulses  Heart sounds: Normal heart sounds  No murmur heard  Pulmonary:      Effort: Pulmonary effort is normal  No respiratory distress  Breath sounds: Normal breath sounds  Abdominal:      General: Abdomen is flat  Bowel sounds are normal  There is no distension  Palpations: Abdomen is soft  Tenderness: There is abdominal tenderness (generalized)  There is no guarding or rebound  Musculoskeletal:      Right lower leg: No edema  Left lower leg: No edema  Skin:     General: Skin is warm and dry  Neurological:      General: No focal deficit present  Mental Status: She is alert     Psychiatric:         Mood and Affect: Mood normal          Behavior: Behavior normal           Additional Data:   Lab Results:  Results from last 7 days   Lab Units 04/17/23  0137   WBC Thousand/uL 4 17*   HEMOGLOBIN g/dL 12 2   HEMATOCRIT % 37 0   PLATELETS Thousands/uL 182   NEUTROS PCT % 53   LYMPHS PCT % 31   MONOS PCT % 11   EOS PCT % 3     Results from last 7 days   Lab Units 04/17/23  0137   SODIUM mmol/L 140   POTASSIUM mmol/L 4 0   CHLORIDE mmol/L 106   CO2 mmol/L 26   BUN mg/dL 17   CREATININE mg/dL 0 65   ANION GAP mmol/L 8   CALCIUM mg/dL 8 7   ALBUMIN g/dL 4 0   TOTAL BILIRUBIN mg/dL 0 49   ALK PHOS U/L 64   ALT U/L 16   AST U/L 17 " GLUCOSE RANDOM mg/dL 91     Results from last 7 days   Lab Units 04/17/23  0137   INR  0 96                   Lines/Drains:  Invasive Devices     Peripheral Intravenous Line  Duration           Peripheral IV 04/17/23 Left Antecubital <1 day                    Imaging: Reviewed radiology reports from this admission including: abdominal/pelvic CT  CT abdomen pelvis with contrast   Final Result by Nahomi Johnson MD (04/17 0302)      No evidence of acute intra-abdominal or pelvic pathology            Workstation performed: [id number]             EKG and Other Studies Reviewed on Admission:   · EKG: No EKG obtained  ** Please Note: This note has been constructed using a voice recognition system   **

## 2023-04-17 NOTE — PLAN OF CARE
Problem: PAIN - ADULT  Goal: Verbalizes/displays adequate comfort level or baseline comfort level  Description: Interventions:  - Encourage patient to monitor pain and request assistance  - Assess pain using appropriate pain scale  - Administer analgesics based on type and severity of pain and evaluate response  - Implement non-pharmacological measures as appropriate and evaluate response  - Consider cultural and social influences on pain and pain management  - Notify physician/advanced practitioner if interventions unsuccessful or patient reports new pain  Outcome: Progressing     Problem: INFECTION - ADULT  Goal: Absence or prevention of progression during hospitalization  Description: INTERVENTIONS:  - Assess and monitor for signs and symptoms of infection  - Monitor lab/diagnostic results  - Administer medications as ordered  - Instruct and encourage patient and family to use good hand hygiene technique  Outcome: Progressing  Goal: Absence of fever/infection during neutropenic period  Description: INTERVENTIONS:  - Monitor WBC    Outcome: Progressing     Problem: SAFETY ADULT  Goal: Patient will remain free of falls  Description: INTERVENTIONS:  - Educate patient/family on patient safety including physical limitations  - Instruct patient to call for assistance with activity   - Consult OT/PT to assist with strengthening/mobility   - Keep Call bell within reach  - Keep bed low and locked with side rails adjusted as appropriate  - Keep care items and personal belongings within reach  - Initiate and maintain comfort rounds  Outcome: Progressing  Goal: Maintain or return to baseline ADL function  Description: INTERVENTIONS:  -  Assess patient's ability to carry out ADLs; assess patient's baseline for ADL function and identify physical deficits which impact ability to perform ADLs (bathing, care of mouth/teeth, toileting, grooming, dressing, etc )  - Assess/evaluate cause of self-care deficits   - Assess range of motion  - Assess patient's mobility; develop plan if impaired  - Assess patient's need for assistive devices and provide as appropriate  - Encourage maximum independence but intervene and supervise when necessary  - Involve family in performance of ADLs  - Assess for home care needs following discharge   - Consider OT consult to assist with ADL evaluation and planning for discharge  - Provide patient education as appropriate  Outcome: Progressing  Goal: Maintains/Returns to pre admission functional level  Description: INTERVENTIONS:  - Perform BMAT or MOVE assessment daily    - Set and communicate daily mobility goal to care team and patient/family/caregiver  - Collaborate with rehabilitation services on mobility goals if consulted  - Perform Range of Motion  3 times a day    - Dangle patient  3  times a day  - Stand patient  3  times a day  - Ambulate patient  3 times a day  - Out of bed to chair  3  times a day   - Out of bed for meals  3  times a day  - Out of bed for toileting  - Record patient progress and toleration of activity level   Outcome: Progressing     Problem: DISCHARGE PLANNING  Goal: Discharge to home or other facility with appropriate resources  Description: INTERVENTIONS:  - Identify barriers to discharge w/patient and caregiver  - Arrange for needed discharge resources and transportation as appropriate  - Identify discharge learning needs (meds, wound care, etc )  - Arrange for interpretive services to assist at discharge as needed  - Refer to Case Management Department for coordinating discharge planning if the patient needs post-hospital services based on physician/advanced practitioner order or complex needs related to functional status, cognitive ability, or social support system  Outcome: Progressing     Problem: Knowledge Deficit  Goal: Patient/family/caregiver demonstrates understanding of disease process, treatment plan, medications, and discharge instructions  Description: Complete learning assessment and assess knowledge base    Interventions:  - Provide teaching at level of understanding  - Provide teaching via preferred learning methods  Outcome: Progressing

## 2023-04-17 NOTE — ED NOTES
Patient transported to [address] [place] [address], [address] [place] [address]  04/17/23 0010

## 2023-04-17 NOTE — CONSULTS
Consultation -[facility]   Lenard Kyle 60 y o  female MRN: [id number]    Unit/Bed#: -01 Encounter: [id number]      Physician Requesting Consult: Dr Pankaj Motley     Reason for Consult / Principal Problem: dark stools    HPI:   This is a 60 y o  female with a PMH of hemorrhoids, diverticulosis, HTN who presents with due to 6 episodes of passing large amounts of dark clots per rectum tonight  Associated with generalized abdominal pain, worse in left lower quadrant  Reports that she has not had a normal bowel movement since Saturday morning  Typically goes 2-3x per day  States BMs have been smaller and pebble-like  Denies dark stools, hematochezia, hematemesis, rectal pain  Reports a history of hemorrhoids and does have scant bright red blood when wiping with toilet tissue  States she has never passed clots like this before in the past   Reports recent hemorrhoidal band ligation 3/30  She also states that over the past 3 to 4 days she has been taking naproxen for knee pain/swelling  Reports she has had a colonoscopy in the past revealing diverticulosis and internal hemorrhoids  Does note history of EGD-  States that she does have reflux symptoms frequently  EGD in system was performed in 2010 which did show some distal esophageal erosions suggestive of reflux  Patient reports that there was no red blood, it was very dark clots that she was passing but she denies any associated stool  She reports that she had some nausea post banding at the end of last month but she does not have any significant nausea now  She states that she had 6 episodes of passing large clots and this started around 6 PM last night and occurred till midnight and around 1030 she developed abdominal pain and bloating  Patient does have difficulty localizing the pain but currently is stating that she thinks it is most present in the left upper quadrant    She does take NSAIDs on occasion and she reports that she had knee surgery on February 27 and since that time she will intermittently take ibuprofen and then was taking Aleve for the past few days but otherwise denies any excessive NSAID use  Does not take any PPI for her reflux symptoms  Allergies:    Allergies   Allergen Reactions   • Chlorhexidine Hives   • Latex Other (See Comments)     Skin irritation   • Lisinopril Cough       Medications:  Current Facility-Administered Medications:   •  acetaminophen (TYLENOL) tablet 650 mg, 650 mg, Oral, Q6H PRN, Siobhan Smith PA-C  •  hydrochlorothiazide (HYDRODIURIL) tablet 25 mg, 25 mg, Oral, Daily, Siobhan Smith PA-C, 25 mg at 04/17/23 5494  •  influenza vaccine, recombinant, quadrivalent (FLUBLOK) IM injection 0 5 mL, 0 5 mL, Intramuscular, Once, Siobhan Smith PA-C  •  lactated ringers infusion, 75 mL/hr, Intravenous, Continuous, Siobhan Smith PA-C, Last Rate: 75 mL/hr at 04/17/23 0533, 75 mL/hr at 04/17/23 0533  •  metoprolol succinate (TOPROL-XL) 24 hr tablet 50 mg, 50 mg, Oral, Daily, Siobhan Smith PA-C, 50 mg at 04/17/23 3727  •  oxyCODONE (ROXICODONE) IR tablet 5 mg, 5 mg, Oral, Q4H PRN, Anjana Gupta PA-C  •  pantoprazole (PROTONIX) injection 40 mg, 40 mg, Intravenous, Q12H [address], Siobhan Smith PA-C, 40 mg at 04/17/23 7916    Past Medical history:  Past Medical History:   Diagnosis Date   • Aortic aneurysm (HCC)    • Arthritis     pelvic   • Chronic pain disorder     back  lower abd   • Colon polyp    • Diverticulitis    • GERD (gastroesophageal reflux disease)    • Hypertension        Past Surgical History:   Past Surgical History:   Procedure Laterality Date   • ABDOMINAL SURGERY     • BREAST SURGERY      reduction   • CHOLECYSTECTOMY     • COLONOSCOPY     • HYSTERECTOMY         Family History:   Family History   Problem Relation Age of Onset   • Hypertension Mother    • Breast cancer Mother    • Hypertension Father "      Social history:   Social History     Socioeconomic History   • Marital status:      Spouse name: Not on file   • Number of children: Not on file   • Years of education: Not on file   • Highest education level: Not on file   Occupational History   • Not on file   Tobacco Use   • Smoking status: Never   • Smokeless tobacco: Never   Vaping Use   • Vaping Use: Never used   Substance and Sexual Activity   • Alcohol use:  Yes     Alcohol/week: 1 0 standard drink     Types: 1 Glasses of wine per week     Comment: 1 per day   • Drug use: Yes     Types: Marijuana     Comment: medical marijuana card   • Sexual activity: Not on file   Other Topics Concern   • Not on file   Social History Narrative   • Not on file     Social Determinants of Health     Financial Resource Strain: Not on file   Food Insecurity: Not on file   Transportation Needs: Not on file   Physical Activity: Not on file   Stress: Not on file   Social Connections: Not on file   Intimate Partner Violence: Not on file   Housing Stability: Not on file       Review of Systems: All other systems were reviewed and were negative, otherwise please refer to HPI    Physical Exam: /90 (BP Location: Right arm)   Pulse 61   Temp (!) 97 4 °F (36 3 °C) (Oral)   Resp 18   Ht 5' 3\" (1 6 m)   Wt 85 8 kg (189 lb 3 2 oz)   SpO2 92%   BMI 33 52 kg/m²     General Appearance:    Alert, cooperative, no distress, appears stated age   Head:    Normocephalic, without obvious abnormality, atraumatic   Eyes:    No scleral icterus           Mouth:  Mucosa moist   Neck:   Supple, symmetrical, trachea midline, no thyromegaly       Lungs:     Clear to auscultation bilaterally, respirations unlabored       Heart:    Regular rate and rhythm, S1 and S2 normal, no murmur, rub   or gallop     Abdomen:     Soft, tenderness to palpation diffusely especially her upper abdomen, bowel sounds active all four quadrants,     no masses, no organomegaly   Genitalia:   deferred " Rectal:   deferred   Extremities:   Extremities normal,no cyanosis or edema       Skin:   Skin color, texture, turgor normal, no rashes or lesions       Neurologic:   Grossly intact, no focal deficit           Lab Results:   Recent Results (from the past 24 hour(s))   CBC and differential    Collection Time: 04/17/23  1:37 AM   Result Value Ref Range    WBC 4 17 (L) 4 31 - 10 16 Thousand/uL    RBC 4 37 3 81 - 5 12 Million/uL    Hemoglobin 12 2 11 5 - 15 4 g/dL    Hematocrit 37 0 34 8 - 46 1 %    MCV 85 82 - 98 fL    MCH 27 9 26 8 - 34 3 pg    MCHC 33 0 31 4 - 37 4 g/dL    RDW 13 2 11 6 - 15 1 %    MPV 9 9 8 9 - 12 7 fL    Platelets 840 134 - 830 Thousands/uL    nRBC 0 /100 WBCs    Neutrophils Relative 53 43 - 75 %    Immat GRANS % 1 0 - 2 %    Lymphocytes Relative 31 14 - 44 %    Monocytes Relative 11 4 - 12 %    Eosinophils Relative 3 0 - 6 %    Basophils Relative 1 0 - 1 %    Neutrophils Absolute 2 23 1 85 - 7 62 Thousands/µL    Immature Grans Absolute 0 03 0 00 - 0 20 Thousand/uL    Lymphocytes Absolute 1 31 0 60 - 4 47 Thousands/µL    Monocytes Absolute 0 44 0 17 - 1 22 Thousand/µL    Eosinophils Absolute 0 14 0 00 - 0 61 Thousand/µL    Basophils Absolute 0 02 0 00 - 0 10 Thousands/µL   Basic metabolic panel    Collection Time: 04/17/23  1:37 AM   Result Value Ref Range    Sodium 140 135 - 147 mmol/L    Potassium 4 0 3 5 - 5 3 mmol/L    Chloride 106 96 - 108 mmol/L    CO2 26 21 - 32 mmol/L    ANION GAP 8 4 - 13 mmol/L    BUN 17 5 - 25 mg/dL    Creatinine 0 65 0 60 - 1 30 mg/dL    Glucose 91 65 - 140 mg/dL    Calcium 8 7 8 4 - 10 2 mg/dL    eGFR 94 ml/min/1 73sq m   Type and screen    Collection Time: 04/17/23  1:37 AM   Result Value Ref Range    ABO Grouping O     Rh Factor Positive     Antibody Screen Negative     Specimen Expiration Date [id number]    Hepatic function panel    Collection Time: 04/17/23  1:37 AM   Result Value Ref Range    Total Bilirubin 0 49 0 20 - 1 00 mg/dL    Bilirubin, Direct 0 12 0 00 - 0 20 mg/dL    Alkaline Phosphatase 64 34 - 104 U/L    AST 17 13 - 39 U/L    ALT 16 7 - 52 U/L    Total Protein 6 6 6 4 - 8 4 g/dL    Albumin 4 0 3 5 - 5 0 g/dL   Lipase    Collection Time: 04/17/23  1:37 AM   Result Value Ref Range    Lipase 22 11 - 82 u/L   Protime-INR    Collection Time: 04/17/23  1:37 AM   Result Value Ref Range    Protime 13 1 11 6 - 14 5 seconds    INR 0 96 0 84 - 1 19   APTT    Collection Time: 04/17/23  1:37 AM   Result Value Ref Range    PTT 31 23 - 37 seconds   Occult Bloood,Fecal Immunochemical    Collection Time: 04/17/23  1:38 AM   Result Value Ref Range    OCCULT BLD, FECAL IMMUNOLOGICAL Positive (A) Negative   CBC and differential    Collection Time: 04/17/23  9:01 AM   Result Value Ref Range    WBC 3 34 (L) 4 31 - 10 16 Thousand/uL    RBC 3 90 3 81 - 5 12 Million/uL    Hemoglobin 11 0 (L) 11 5 - 15 4 g/dL    Hematocrit 33 1 (L) 34 8 - 46 1 %    MCV 85 82 - 98 fL    MCH 28 2 26 8 - 34 3 pg    MCHC 33 2 31 4 - 37 4 g/dL    RDW 13 3 11 6 - 15 1 %    MPV 9 7 8 9 - 12 7 fL    Platelets 406 478 - 655 Thousands/uL    nRBC 0 /100 WBCs    Neutrophils Relative 56 43 - 75 %    Immat GRANS % 0 0 - 2 %    Lymphocytes Relative 28 14 - 44 %    Monocytes Relative 11 4 - 12 %    Eosinophils Relative 4 0 - 6 %    Basophils Relative 1 0 - 1 %    Neutrophils Absolute 1 91 1 85 - 7 62 Thousands/µL    Immature Grans Absolute 0 01 0 00 - 0 20 Thousand/uL    Lymphocytes Absolute 0 92 0 60 - 4 47 Thousands/µL    Monocytes Absolute 0 35 0 17 - 1 22 Thousand/µL    Eosinophils Absolute 0 12 0 00 - 0 61 Thousand/µL    Basophils Absolute 0 03 0 00 - 0 10 Thousands/µL   Basic metabolic panel    Collection Time: 04/17/23  9:01 AM   Result Value Ref Range    Sodium 139 135 - 147 mmol/L    Potassium 3 5 3 5 - 5 3 mmol/L    Chloride 107 96 - 108 mmol/L    CO2 24 21 - 32 mmol/L    ANION GAP 8 4 - 13 mmol/L    BUN 13 5 - 25 mg/dL    Creatinine 0 54 (L) 0 60 - 1 30 mg/dL    Glucose 89 65 - 140 mg/dL    Glucose, Fasting 89 65 - 99 mg/dL    Calcium 8 1 (L) 8 4 - 10 2 mg/dL    eGFR 100 ml/min/1 73sq m       Imaging Studies: CT abdomen pelvis with contrast    Result Date: 4/17/2023  Narrative: CT ABDOMEN AND PELVIS WITH IV CONTRAST INDICATION:   LLQ pain - bloody bowel movements  COMPARISON:  None  TECHNIQUE:  CT examination of the abdomen and pelvis was performed  Multiplanar 2D reformatted images were created from the source data  Radiation dose length product (DLP) for this visit:  701 9 mGy-cm   This examination, like all CT scans performed in the [facility], was performed utilizing techniques to minimize radiation dose exposure, including the use of iterative reconstruction and automated exposure control  IV Contrast:  100 mL of iohexol (OMNIPAQUE) Enteric Contrast:  Enteric contrast was not administered  FINDINGS: ABDOMEN LOWER CHEST:  No clinically significant abnormality identified in the visualized lower chest  LIVER/BILIARY TREE:  Unremarkable  GALLBLADDER:  Gallbladder is surgically absent  SPLEEN:  Unremarkable  PANCREAS:  Unremarkable  ADRENAL GLANDS:  Unremarkable  KIDNEYS/URETERS:  Nonobstructing right nephrolithiasis  No hydronephrosis  STOMACH AND BOWEL: Status post sleeve gastrectomy  There is colonic diverticulosis without evidence of acute diverticulitis  APPENDIX:  No findings to suggest appendicitis  ABDOMINOPELVIC CAVITY:  No ascites  No pneumoperitoneum  No lymphadenopathy  VESSELS:  Unremarkable for patient's age  PELVIS REPRODUCTIVE ORGANS:  Surgical changes of prior hysterectomy  URINARY BLADDER:  Unremarkable  ABDOMINAL WALL/INGUINAL REGIONS:  Unremarkable  OSSEOUS STRUCTURES:  No acute fracture or destructive osseous lesion       Impression: No evidence of acute intra-abdominal or pelvic pathology Workstation performed: [id number]       Assessment/Plan:  GI bleed  Presents due passing large amounts of dark clots per rectum x 6 last night- Reported last BM 4/15 and denies dark stool or hematchezia at that time  No vomiting, hematemesis  Associated generalized abdominal pain, worse in LLQ pain  Notes has been taking Naproxen over the past 4 days due to knee pain  • Hx of diverticulosis, GERD, hemorrhoids s/p recent band ligation 3/30 w/ Dr Yaya Vega   • CT a/p wo acute abnormality   • Colonoscopy was performed in December 2021 which did show extensive diverticulosis throughout the left colon suggestive of resolving diverticulitis, no polyps  • Hemoglobin did trend down from 12-11 this morning  2 baseline appears to be around 13  • We will plan for EGD for further evaluation  • Suspect patient could have upper GI bleed from PUD due to recent NSAID use  • Patient also does take baby aspirin daily and intermittently taking ibuprofen as well as naproxen since her recent knee surgery at the end of February  • Continue n p o  and continue IV Protonix twice daily  • If EGD is unremarkable then may need repeat colonoscopy pending her course  • Follow serial H&H    Thank you for the consultation and case will be discussed with Dr Yaya Vega

## 2023-04-17 NOTE — PLAN OF CARE
Problem: PAIN - ADULT  Goal: Verbalizes/displays adequate comfort level or baseline comfort level  Description: Interventions:  - Encourage patient to monitor pain and request assistance  - Assess pain using appropriate pain scale  - Administer analgesics based on type and severity of pain and evaluate response  - Implement non-pharmacological measures as appropriate and evaluate response  - Consider cultural and social influences on pain and pain management  - Notify physician/advanced practitioner if interventions unsuccessful or patient reports new pain  Outcome: Progressing     Problem: INFECTION - ADULT  Goal: Absence or prevention of progression during hospitalization  Description: INTERVENTIONS:  - Assess and monitor for signs and symptoms of infection  - Monitor lab/diagnostic results  - Monitor all insertion sites, i e  indwelling lines, tubes, and drains  - Monitor endotracheal if appropriate and nasal secretions for changes in amount and color  - [place] appropriate cooling/warming therapies per order  - Administer medications as ordered  - Instruct and encourage patient and family to use good hand hygiene technique  - Identify and instruct in appropriate isolation precautions for identified infection/condition  Outcome: Progressing  Goal: Absence of fever/infection during neutropenic period  Description: INTERVENTIONS:  - Monitor WBC    Outcome: Progressing     Problem: SAFETY ADULT  Goal: Patient will remain free of falls  Description: INTERVENTIONS:  - Educate patient/family on patient safety including physical limitations  - Instruct patient to call for assistance with activity   - Consult OT/PT to assist with strengthening/mobility   - Keep Call bell within reach  - Keep bed low and locked with side rails adjusted as appropriate  - Keep care items and personal belongings within reach  - Initiate and maintain comfort rounds  - Make Fall Risk Sign visible to staff  - Offer Toileting every  Hours, in advance of need  - Initiate/Maintain alarm  - Obtain necessary fall risk management equipment:   - Apply yellow socks and bracelet for high fall risk patients  - Consider moving patient to room near nurses station  Outcome: Progressing  Goal: Maintain or return to baseline ADL function  Description: INTERVENTIONS:  -  Assess patient's ability to carry out ADLs; assess patient's baseline for ADL function and identify physical deficits which impact ability to perform ADLs (bathing, care of mouth/teeth, toileting, grooming, dressing, etc )  - Assess/evaluate cause of self-care deficits   - Assess range of motion  - Assess patient's mobility; develop plan if impaired  - Assess patient's need for assistive devices and provide as appropriate  - Encourage maximum independence but intervene and supervise when necessary  - Involve family in performance of ADLs  - Assess for home care needs following discharge   - Consider OT consult to assist with ADL evaluation and planning for discharge  - Provide patient education as appropriate  Outcome: Progressing  Goal: Maintains/Returns to pre admission functional level  Description: INTERVENTIONS:  - Perform BMAT or MOVE assessment daily    - Set and communicate daily mobility goal to care team and patient/family/caregiver  - Collaborate with rehabilitation services on mobility goals if consulted  - Perform Range of Motion times a day  - Reposition patient every  hours    - Dangle patient  times a day  - Stand patient  times a day  - Ambulate patient  times a day  - Out of bed to chair  times a day   - Out of bed for meals times a day  - Out of bed for toileting  - Record patient progress and toleration of activity level   Outcome: Progressing     Problem: DISCHARGE PLANNING  Goal: Discharge to home or other facility with appropriate resources  Description: INTERVENTIONS:  - Identify barriers to discharge w/patient and caregiver  - Arrange for needed discharge resources and transportation as appropriate  - Identify discharge learning needs (meds, wound care, etc )  - Arrange for interpretive services to assist at discharge as needed  - Refer to Case Management Department for coordinating discharge planning if the patient needs post-hospital services based on physician/advanced practitioner order or complex needs related to functional status, cognitive ability, or social support system  Outcome: Progressing     Problem: Knowledge Deficit  Goal: Patient/family/caregiver demonstrates understanding of disease process, treatment plan, medications, and discharge instructions  Description: Complete learning assessment and assess knowledge base    Interventions:  - Provide teaching at level of understanding  - Provide teaching via preferred learning methods  Outcome: Progressing

## 2023-04-17 NOTE — ED PROVIDER NOTES
History  Chief Complaint   Patient presents with   • Rectal Bleeding     Pt presents to the ED stating that she had a BM that contained blood and blood clots at around 1830 last night     58-year-old female previous history of ascending aortic aneurysm, diverticulitis, gastroesophageal reflux disease, hemorrhoids status post banding, hypertension presents for 6 bloody bowel movements  Reports approximately 3 episodes of bowel movements with a large amount of clots  Then reportedly had 3 episodes of bowel movements with clots in them though significantly less than the previous  No shortness of breath, nausea, vomiting  No history of GI bleeds besides her hemorrhoids  Reports only minimal blood after wiping  Not consistent with her hemorrhoid bleeding  Patient is on aspirin 81 mg daily  She states that she takes this because of her blood pressure  Patient also notes left lower quadrant pain present since earlier today         Rectal Bleeding - Minor  Quality:  Bright red  Amount: Moderate  Timing:  Constant  Chronicity:  New  Context: hemorrhoids    Associated symptoms: abdominal pain        Prior to Admission Medications   Prescriptions Last Dose Informant Patient Reported? Taking?    Multiple Vitamins-Minerals (multivitamin with minerals) tablet 4/16/2023  Yes Yes   Sig: Take 1 tablet by mouth daily   aspirin (ECOTRIN LOW STRENGTH) 81 mg EC tablet 4/17/2023  Yes Yes   Sig: Take 81 mg by mouth daily Every other day   aspirin-acetaminophen-caffeine (Excedrin Migraine) 250-250-65 MG per tablet Past Week  Yes Yes   Sig: TAKE 1 TABLET 3 TIMES DAILY AS NEEDED   hydrochlorothiazide (HYDRODIURIL) 25 mg tablet   Yes No   Sig: Take 25 mg by mouth daily   metoprolol succinate (TOPROL-XL) 50 mg 24 hr tablet Past Week  Yes Yes   Sig: Take 50 mg by mouth daily      Facility-Administered Medications: None       Past Medical History:   Diagnosis Date   • Aortic aneurysm (HCC)    • Arthritis     pelvic   • Chronic pain disorder     back  lower abd   • Colon polyp    • Diverticulitis    • GERD (gastroesophageal reflux disease)    • Hypertension        Past Surgical History:   Procedure Laterality Date   • ABDOMINAL SURGERY     • BREAST SURGERY      reduction   • CHOLECYSTECTOMY     • COLONOSCOPY     • HYSTERECTOMY         Family History   Problem Relation Age of Onset   • Hypertension Mother    • Breast cancer Mother    • Hypertension Father      I have reviewed and agree with the history as documented  E-Cigarette/Vaping   • E-Cigarette Use Never User      E-Cigarette/Vaping Substances   • Nicotine No    • THC No    • CBD No    • Flavoring No      Social History     Tobacco Use   • Smoking status: Never   • Smokeless tobacco: Never   Vaping Use   • Vaping Use: Never used   Substance Use Topics   • Alcohol use: Yes     Alcohol/week: 1 0 standard drink     Types: 1 Glasses of wine per week     Comment: 1 per day   • Drug use: Yes     Types: Marijuana     Comment: medical marijuana card       Review of Systems   Gastrointestinal: Positive for abdominal pain, blood in stool and hematochezia  All other systems reviewed and are negative  Physical Exam  Physical Exam  Vitals and nursing note reviewed  Constitutional:       General: She is not in acute distress  Appearance: Normal appearance  She is not ill-appearing  HENT:      Head: Normocephalic and atraumatic  Right Ear: External ear normal       Left Ear: External ear normal       Nose: Nose normal       Mouth/Throat:      Mouth: Mucous membranes are moist    Eyes:      General:         Right eye: No discharge  Left eye: No discharge  Conjunctiva/sclera: Conjunctivae normal    Cardiovascular:      Rate and Rhythm: Normal rate and regular rhythm  Pulses: Normal pulses  Heart sounds: No murmur heard  Pulmonary:      Effort: Pulmonary effort is normal       Breath sounds: Normal breath sounds     Abdominal:      General: Abdomen is flat  There is no distension  Tenderness: There is abdominal tenderness (LLQ)  There is no guarding or rebound  Genitourinary:     Comments: Blood in rectal vault  Hemorrhoids/ skin tags noted  No thrombosed hemorrhoids or bleeding from the hemorrhoids  Musculoskeletal:         General: Normal range of motion  Cervical back: Normal range of motion  Skin:     General: Skin is warm  Capillary Refill: Capillary refill takes less than 2 seconds  Findings: No rash  Neurological:      General: No focal deficit present  Mental Status: She is alert  Mental status is at baseline     Psychiatric:         Mood and Affect: Mood normal          Behavior: Behavior normal          Vital Signs  ED Triage Vitals   Temperature Pulse Respirations Blood Pressure SpO2   04/17/23 0104 04/17/23 0105 04/17/23 0105 04/17/23 0105 04/17/23 0105   97 6 °F (36 4 °C) 73 17 (!) 194/119 99 %      Temp Source Heart Rate Source Patient Position - Orthostatic VS BP Location FiO2 (%)   04/17/23 0104 04/17/23 0105 04/17/23 0105 04/17/23 0105 --   Oral Monitor Lying Left arm       Pain Score       04/17/23 0110       9           Vitals:    04/17/23 0105 04/17/23 0110 04/17/23 0258 04/17/23 0354   BP: (!) 194/119 (!) 190/106 142/88 150/84   Pulse: 73  70 67   Patient Position - Orthostatic VS: Lying Lying Lying Lying         Visual Acuity      ED Medications  Medications   hydrochlorothiazide (HYDRODIURIL) tablet 25 mg (has no administration in time range)   metoprolol succinate (TOPROL-XL) 24 hr tablet 50 mg (has no administration in time range)   influenza vaccine, recombinant, quadrivalent (FLUBLOK) IM injection 0 5 mL (has no administration in time range)   acetaminophen (TYLENOL) tablet 650 mg (has no administration in time range)   oxyCODONE (ROXICODONE) IR tablet 5 mg (has no administration in time range)   pantoprazole (PROTONIX) injection 40 mg (has no administration in time range)   lactated ringers infusion (has no administration in time range)   acetaminophen (TYLENOL) tablet 975 mg (975 mg Oral Given 4/17/23 0211)   iohexol (OMNIPAQUE) 350 MG/ML injection (SINGLE-DOSE) 100 mL (100 mL Intravenous Given 4/17/23 0231)   morphine injection 2 mg (2 mg Intravenous Given 4/17/23 0305)       Diagnostic Studies  Results Reviewed     Procedure Component Value Units Date/Time    Stool Enteric Bacterial Panel by PCR [[id number]]     Lab Status: No result Specimen: Stool from Per Rectum     CBC and differential [[id number]]  (Abnormal) Collected: 04/17/23 0137    Lab Status: Final result Specimen: Blood from Arm, Left Updated: 04/17/23 0211     WBC 4 17 Thousand/uL      RBC 4 37 Million/uL      Hemoglobin 12 2 g/dL      Hematocrit 37 0 %      MCV 85 fL      MCH 27 9 pg      MCHC 33 0 g/dL      RDW 13 2 %      MPV 9 9 fL      Platelets 678 Thousands/uL      nRBC 0 /100 WBCs      Neutrophils Relative 53 %      Immat GRANS % 1 %      Lymphocytes Relative 31 %      Monocytes Relative 11 %      Eosinophils Relative 3 %      Basophils Relative 1 %      Neutrophils Absolute 2 23 Thousands/µL      Immature Grans Absolute 0 03 Thousand/uL      Lymphocytes Absolute 1 31 Thousands/µL      Monocytes Absolute 0 44 Thousand/µL      Eosinophils Absolute 0 14 Thousand/µL      Basophils Absolute 0 02 Thousands/µL     Hepatic function panel [[id number]]  (Normal) Collected: 04/17/23 0137    Lab Status: Final result Specimen: Blood from Arm, Left Updated: 04/17/23 0201     Total Bilirubin 0 49 mg/dL      Bilirubin, Direct 0 12 mg/dL      Alkaline Phosphatase 64 U/L      AST 17 U/L      ALT 16 U/L      Total Protein 6 6 g/dL      Albumin 4 0 g/dL     Lipase [[id number]]  (Normal) Collected: 04/17/23 0137    Lab Status: Final result Specimen: Blood from Arm, Left Updated: 04/17/23 0201     Lipase 22 u/L     Basic metabolic panel [[id number]] Collected: 04/17/23 0137    Lab Status: Final result Specimen: Blood from Arm, Left Updated: 04/17/23 0201 Sodium 140 mmol/L      Potassium 4 0 mmol/L      Chloride 106 mmol/L      CO2 26 mmol/L      ANION GAP 8 mmol/L      BUN 17 mg/dL      Creatinine 0 65 mg/dL      Glucose 91 mg/dL      Calcium 8 7 mg/dL      eGFR 94 ml/min/1 73sq m     Narrative:      [place] guidelines for Chronic Kidney Disease (CKD):   •  Stage 1 with normal or high GFR (GFR > 90 mL/min/1 73 square meters)  •  Stage 2 Mild CKD (GFR = 60-89 mL/min/1 73 square meters)  •  Stage 3A Moderate CKD (GFR = 45-59 mL/min/1 73 square meters)  •  Stage 3B Moderate CKD (GFR = 30-44 mL/min/1 73 square meters)  •  Stage 4 Severe CKD (GFR = 15-29 mL/min/1 73 square meters)  •  Stage 5 End Stage CKD (GFR <15 mL/min/1 73 square meters)  Note: GFR calculation is accurate only with a steady state creatinine    Protime-INR [[id number]]  (Normal) Collected: 04/17/23 0137    Lab Status: Final result Specimen: Blood from Arm, Left Updated: 04/17/23 0153     Protime 13 1 seconds      INR 0 96    APTT [[id number]]  (Normal) Collected: 04/17/23 0137    Lab Status: Final result Specimen: Blood from Arm, Left Updated: 04/17/23 0153     PTT 31 seconds     Occult Bloood,Fecal Immunochemical [[id number]]  (Abnormal) Collected: 04/17/23 0138    Lab Status: Final result Specimen: Stool from Per Rectum Updated: 04/17/23 0143     OCCULT BLD, FECAL IMMUNOLOGICAL Positive    Narrative:        Performed by Fecal Immunochemical Test                  CT abdomen pelvis with contrast   Final Result by Syl Trujillo MD (04/17 0302)      No evidence of acute intra-abdominal or pelvic pathology            Workstation performed: [id number]                    Procedures  Procedures         ED Course                               SBIRT 20yo+    Flowsheet Row Most Recent Value   Initial Alcohol Screen: US AUDIT-C     1  How often do you have a drink containing alcohol? 0 Filed at: 04/17/2023 0139   2   How many drinks containing alcohol do you have on a typical day you are drinking? 0 Filed at: 04/17/2023 0139   3a  Male UNDER 65: How often do you have five or more drinks on one occasion? 0 Filed at: 04/17/2023 0139   3b  FEMALE Any Age, or MALE 65+: How often do you have 4 or more drinks on one occassion? 0 Filed at: 04/17/2023 0139   Audit-C Score 0 Filed at: 04/17/2023 0139   [place]: How many times in the past year have you    Used an illegal drug or used a prescription medication for non-medical reasons? Never Filed at: 04/17/2023 0139                    Medical Decision Making  Patient with lower GI bleed also abdominal pain  Will evaluate for diverticulitis, colitis, diverticulosis, GI bleed, acute intra-abdominal infection  Patient has blood in the rectal vault  6 bloody bowel movements  None here  Hemodynamically stable  We will plan on admission  CT scan without acute findings  Patient will be admitted to medicine  LLQ abdominal pain: acute illness or injury  Rectal bleeding: acute illness or injury  Amount and/or Complexity of Data Reviewed  Labs: ordered  Radiology: ordered  Risk  OTC drugs  Prescription drug management  Decision regarding hospitalization  Disposition  Final diagnoses:   Rectal bleeding   LLQ abdominal pain     Time reflects when diagnosis was documented in both MDM as applicable and the Disposition within this note     Time User Action Codes Description Comment    4/17/2023  3:09 AM Mario Rudolph Add [K62 5] Rectal bleeding     4/17/2023  3:09 AM Azalea Adams Add [R10 32] LLQ abdominal pain     4/17/2023  4:50 AM Sallie Gordon Add [K92 2] Gastrointestinal hemorrhage, unspecified gastrointestinal hemorrhage type       ED Disposition     ED Disposition   Admit    Condition   Stable    Date/Time   Mon Apr 17, 2023  3:09 AM    Comment   Case was discussed with haresh and the patient's admission status was agreed to be Admission Status: observation status to the service of Dr Cely Mckeon              Follow-up Information    None         Current Discharge Medication List      CONTINUE these medications which have NOT CHANGED    Details   aspirin (ECOTRIN LOW STRENGTH) 81 mg EC tablet Take 81 mg by mouth daily Every other day      aspirin-acetaminophen-caffeine (Excedrin Migraine) 250-250-65 MG per tablet TAKE 1 TABLET 3 TIMES DAILY AS NEEDED      metoprolol succinate (TOPROL-XL) 50 mg 24 hr tablet Take 50 mg by mouth daily      Multiple Vitamins-Minerals (multivitamin with minerals) tablet Take 1 tablet by mouth daily      hydrochlorothiazide (HYDRODIURIL) 25 mg tablet Take 25 mg by mouth daily             No discharge procedures on file      PDMP Review     None          ED Provider  Electronically Signed by           Joshua Luis DO  04/17/23 5304

## 2023-04-18 VITALS
HEIGHT: 63 IN | SYSTOLIC BLOOD PRESSURE: 134 MMHG | WEIGHT: 187 LBS | RESPIRATION RATE: 20 BRPM | DIASTOLIC BLOOD PRESSURE: 94 MMHG | HEART RATE: 61 BPM | TEMPERATURE: 97.8 F | OXYGEN SATURATION: 96 % | BODY MASS INDEX: 33.13 KG/M2

## 2023-04-18 PROBLEM — Z90.3 S/P GASTRIC SLEEVE PROCEDURE: Status: ACTIVE | Noted: 2023-04-18

## 2023-04-18 LAB
ANION GAP SERPL CALCULATED.3IONS-SCNC: 6 MMOL/L (ref 4–13)
BASOPHILS # BLD AUTO: 0.03 THOUSANDS/ΜL (ref 0–0.1)
BASOPHILS NFR BLD AUTO: 1 % (ref 0–1)
BUN SERPL-MCNC: 11 MG/DL (ref 5–25)
CALCIUM SERPL-MCNC: 8.6 MG/DL (ref 8.4–10.2)
CHLORIDE SERPL-SCNC: 105 MMOL/L (ref 96–108)
CO2 SERPL-SCNC: 28 MMOL/L (ref 21–32)
CREAT SERPL-MCNC: 0.63 MG/DL (ref 0.6–1.3)
EOSINOPHIL # BLD AUTO: 0.13 THOUSAND/ΜL (ref 0–0.61)
EOSINOPHIL NFR BLD AUTO: 4 % (ref 0–6)
ERYTHROCYTE [DISTWIDTH] IN BLOOD BY AUTOMATED COUNT: 13.1 % (ref 11.6–15.1)
GFR SERPL CREATININE-BSD FRML MDRD: 95 ML/MIN/1.73SQ M
GLUCOSE SERPL-MCNC: 85 MG/DL (ref 65–140)
HCT VFR BLD AUTO: 34.9 % (ref 34.8–46.1)
HGB BLD-MCNC: 11.1 G/DL (ref 11.5–15.4)
IMM GRANULOCYTES # BLD AUTO: 0.02 THOUSAND/UL (ref 0–0.2)
IMM GRANULOCYTES NFR BLD AUTO: 1 % (ref 0–2)
LYMPHOCYTES # BLD AUTO: 0.97 THOUSANDS/ΜL (ref 0.6–4.47)
LYMPHOCYTES NFR BLD AUTO: 28 % (ref 14–44)
MCH RBC QN AUTO: 28 PG (ref 26.8–34.3)
MCHC RBC AUTO-ENTMCNC: 31.8 G/DL (ref 31.4–37.4)
MCV RBC AUTO: 88 FL (ref 82–98)
MONOCYTES # BLD AUTO: 0.33 THOUSAND/ΜL (ref 0.17–1.22)
MONOCYTES NFR BLD AUTO: 9 % (ref 4–12)
NEUTROPHILS # BLD AUTO: 2.04 THOUSANDS/ΜL (ref 1.85–7.62)
NEUTS SEG NFR BLD AUTO: 57 % (ref 43–75)
NRBC BLD AUTO-RTO: 0 /100 WBCS
PLATELET # BLD AUTO: 154 THOUSANDS/UL (ref 149–390)
PMV BLD AUTO: 9.6 FL (ref 8.9–12.7)
POTASSIUM SERPL-SCNC: 4.3 MMOL/L (ref 3.5–5.3)
RBC # BLD AUTO: 3.96 MILLION/UL (ref 3.81–5.12)
SODIUM SERPL-SCNC: 139 MMOL/L (ref 135–147)
TSH SERPL DL<=0.05 MIU/L-ACNC: 3.43 UIU/ML (ref 0.45–4.5)
WBC # BLD AUTO: 3.52 THOUSAND/UL (ref 4.31–10.16)

## 2023-04-18 RX ORDER — PANTOPRAZOLE SODIUM 40 MG/1
40 TABLET, DELAYED RELEASE ORAL DAILY
Qty: 30 TABLET | Refills: 0 | Status: SHIPPED | OUTPATIENT
Start: 2023-04-18 | End: 2023-05-18

## 2023-04-18 RX ORDER — SUCRALFATE 1 G/1
1 TABLET ORAL
Status: DISCONTINUED | OUTPATIENT
Start: 2023-04-18 | End: 2023-04-18 | Stop reason: HOSPADM

## 2023-04-18 RX ORDER — SUCRALFATE 1 G/1
1 TABLET ORAL
Qty: 120 TABLET | Refills: 0 | Status: SHIPPED | OUTPATIENT
Start: 2023-04-18 | End: 2023-05-18

## 2023-04-18 RX ADMIN — SUCRALFATE 1 G: 1 TABLET ORAL at 12:28

## 2023-04-18 RX ADMIN — HYDROCHLOROTHIAZIDE 25 MG: 25 TABLET ORAL at 09:52

## 2023-04-18 RX ADMIN — SODIUM CHLORIDE, SODIUM LACTATE, POTASSIUM CHLORIDE, AND CALCIUM CHLORIDE 75 ML/HR: .6; .31; .03; .02 INJECTION, SOLUTION INTRAVENOUS at 03:09

## 2023-04-18 RX ADMIN — PANTOPRAZOLE SODIUM 40 MG: 40 INJECTION, POWDER, FOR SOLUTION INTRAVENOUS at 09:53

## 2023-04-18 RX ADMIN — METOPROLOL SUCCINATE 50 MG: 50 TABLET, EXTENDED RELEASE ORAL at 09:52

## 2023-04-18 NOTE — PLAN OF CARE
Problem: PAIN - ADULT  Goal: Verbalizes/displays adequate comfort level or baseline comfort level  Description: Interventions:  - Encourage patient to monitor pain and request assistance  - Assess pain using appropriate pain scale  - Administer analgesics based on type and severity of pain and evaluate response  - Implement non-pharmacological measures as appropriate and evaluate response  - Consider cultural and social influences on pain and pain management  - Notify physician/advanced practitioner if interventions unsuccessful or patient reports new pain  Outcome: Progressing     Problem: INFECTION - ADULT  Goal: Absence or prevention of progression during hospitalization  Description: INTERVENTIONS:  - Assess and monitor for signs and symptoms of infection  - Monitor lab/diagnostic results  - Monitor all insertion sites, i e  indwelling lines, tubes, and drains  - Monitor endotracheal if appropriate and nasal secretions for changes in amount and color  - [place] appropriate cooling/warming therapies per order  - Administer medications as ordered  - Instruct and encourage patient and family to use good hand hygiene technique  - Identify and instruct in appropriate isolation precautions for identified infection/condition  Outcome: Progressing  Goal: Absence of fever/infection during neutropenic period  Description: INTERVENTIONS:  - Monitor WBC    Outcome: Progressing     Problem: SAFETY ADULT  Goal: Patient will remain free of falls  Description: INTERVENTIONS:  - Educate patient/family on patient safety including physical limitations  - Instruct patient to call for assistance with activity   - Consult OT/PT to assist with strengthening/mobility   - Keep Call bell within reach  - Keep bed low and locked with side rails adjusted as appropriate  - Keep care items and personal belongings within reach  - Initiate and maintain comfort rounds  - Make Fall Risk Sign visible to staff  - Offer Toileting every 2 Hours, in advance of need  - Initiate/Maintain bed alarm  - Obtain necessary fall risk management equipment:   - Apply yellow socks and bracelet for high fall risk patients  - Consider moving patient to room near nurses station  Outcome: Progressing  Goal: Maintain or return to baseline ADL function  Description: INTERVENTIONS:  -  Assess patient's ability to carry out ADLs; assess patient's baseline for ADL function and identify physical deficits which impact ability to perform ADLs (bathing, care of mouth/teeth, toileting, grooming, dressing, etc )  - Assess/evaluate cause of self-care deficits   - Assess range of motion  - Assess patient's mobility; develop plan if impaired  - Assess patient's need for assistive devices and provide as appropriate  - Encourage maximum independence but intervene and supervise when necessary  - Involve family in performance of ADLs  - Assess for home care needs following discharge   - Consider OT consult to assist with ADL evaluation and planning for discharge  - Provide patient education as appropriate  Outcome: Progressing  Goal: Maintains/Returns to pre admission functional level  Description: INTERVENTIONS:  - Perform BMAT or MOVE assessment daily    - Set and communicate daily mobility goal to care team and patient/family/caregiver  - Collaborate with rehabilitation services on mobility goals if consulted  - Perform Range of Motion 2 times a day  - Reposition patient every 2 hours    - Dangle patient 2 times a day  - Stand patient 2 times a day  - Ambulate patient 2 times a day  - Out of bed to chair 2 times a day   - Out of bed for meals 2 times a day  - Out of bed for toileting  - Record patient progress and toleration of activity level   Outcome: Progressing     Problem: DISCHARGE PLANNING  Goal: Discharge to home or other facility with appropriate resources  Description: INTERVENTIONS:  - Identify barriers to discharge w/patient and caregiver  - Arrange for needed discharge resources and transportation as appropriate  - Identify discharge learning needs (meds, wound care, etc )  - Arrange for interpretive services to assist at discharge as needed  - Refer to Case Management Department for coordinating discharge planning if the patient needs post-hospital services based on physician/advanced practitioner order or complex needs related to functional status, cognitive ability, or social support system  Outcome: Progressing     Problem: Knowledge Deficit  Goal: Patient/family/caregiver demonstrates understanding of disease process, treatment plan, medications, and discharge instructions  Description: Complete learning assessment and assess knowledge base    Interventions:  - Provide teaching at level of understanding  - Provide teaching via preferred learning methods  Outcome: Progressing

## 2023-04-18 NOTE — PLAN OF CARE
Problem: PAIN - ADULT  Goal: Verbalizes/displays adequate comfort level or baseline comfort level  Description: Interventions:  - Encourage patient to monitor pain and request assistance  - Assess pain using appropriate pain scale  - Administer analgesics based on type and severity of pain and evaluate response  - Implement non-pharmacological measures as appropriate and evaluate response  - Consider cultural and social influences on pain and pain management  - Notify physician/advanced practitioner if interventions unsuccessful or patient reports new pain  Outcome: Progressing     Problem: INFECTION - ADULT  Goal: Absence or prevention of progression during hospitalization  Description: INTERVENTIONS:  - Assess and monitor for signs and symptoms of infection  - Monitor lab/diagnostic results  - Monitor all insertion sites, i e  indwelling lines, tubes, and drains  - Monitor endotracheal if appropriate and nasal secretions for changes in amount and color  - [place] appropriate cooling/warming therapies per order  - Administer medications as ordered  - Instruct and encourage patient and family to use good hand hygiene technique  - Identify and instruct in appropriate isolation precautions for identified infection/condition  Outcome: Progressing     Problem: SAFETY ADULT  Goal: Patient will remain free of falls  Description: INTERVENTIONS:  - Educate patient/family on patient safety including physical limitations  - Instruct patient to call for assistance with activity   - Consult OT/PT to assist with strengthening/mobility   - Keep Call bell within reach  - Keep bed low and locked with side rails adjusted as appropriate  - Keep care items and personal belongings within reach  - Initiate and maintain comfort rounds  - Make Fall Risk Sign visible to staff  - Apply yellow socks and bracelet for high fall risk patients  - Consider moving patient to room near nurses station  Outcome: Progressing

## 2023-04-18 NOTE — DISCHARGE INSTR - AVS FIRST PAGE
You were treated and evaluated for concern for lower GI bleed with passing blood clots  You had endoscopy performed on 4/17/2023 which showed erosion of tissue, most likely the source of your bleed  Recommend continuing pantoprazole for 2-3 months, will send with Carafate to help with GI symptoms  Recommend following up with GI outpatient, will follow-up on endoscopy biopsy results  Continue to hold aspirin for at least the next week or so, follow-up with your primary care provider to reassess the need to restart  Would avoid alcohol use, NSAIDs (Motrin/ibuprofen/Advil, naproxen/Aleve) as this can increase your risk of stomach ulcers or GI bleeding  Obtain blood work (CBC) in 1 week from discharge to monitor your hemoglobin/blood levels

## 2023-04-18 NOTE — ASSESSMENT & PLAN NOTE
Presented after passing large amounts of blood clots per rectum on night of arrival  Reports last BM 4/15, denies dark stool, hematchezia, vomiting at that time  Associated generalized abdominal pain, worse in LLQ pain  Notes has been taking Naproxen over the past 4 days due to knee pain  · Hx of diverticulosis, GERD, hemorrhoids s/p recent rubber band ligation 3/30 w/ Dr Crow Baker   · FOBT positive, CT a/p wo acute abnormality  · Hgb 12 2 on arrival, remained stable around 11 this admission  · EGD (4/17/23): Hemorrhagic lesion gastric antrum, no active bleeding seen    · GI following:  · Continue PPI for 2-3 months, add Carafate for symptom management  · Can continue holding ASA for next week, follow-up with PCP to reassess need for preventative tx  · Follow-up outpatient awaiting biopsy results  · Obtain CBC in 1 week from discharge

## 2023-04-18 NOTE — ASSESSMENT & PLAN NOTE
· BP reviewed, slightly above goal but overall stable   · Continue Toprol 50mg daily, HCTZ 25mg daily

## 2023-04-18 NOTE — UTILIZATION REVIEW
Initial Clinical Review    Admission: Date/Time/Statement: 4/17/23 0309 Observation   Admission Orders (From admission, onward)     Ordered        04/17/23 0309  Place in Observation  Once                      Orders Placed This Encounter   Procedures   • Place in Observation     Standing Status:   Standing     Number of Occurrences:   1     Order Specific Question:   Level of Care     Answer:   Med Surg [16]     ED Arrival Information     Expected   -    Arrival   4/17/2023 00:57    Acuity   Urgent            Means of arrival   Walk-In    Escorted by   Self    Service   Hospitalist    Admission type   Emergency            Arrival complaint   rectal bleeding            Chief Complaint   Patient presents with   • Rectal Bleeding     Pt presents to the ED stating that she had a BM that contained blood and blood clots at around 1830 last night       Initial Presentation: 60 y o  female from home to ED admitted to observation due to Suspected Upper Gi Bleed  Presented due to about 6 bloody BM starting about 6 hours prior to arrival   On asa  +LLQ abdominal pain starting day prior to arrival   On exam: abdominal tenderness  Blood in rectal vault  Hemorrhoids/Skin tags noted  No bleeding from hemorrhoids  H&H 12 2/37  Fecal occult blood positive  In the ED given 2 doses IV Morphine  Plan is monitor hgb every 8 hours  Consult GI   IV Protonix  Start IVF    4/17/23 per GI - Patient with bloody stools, colonoscopy showed diverticulosis a couple years ago, history of hemorrhoidal banding and last done about 18 days ago  Plan is egd to rule out Upper GI source  4/17/23 egd - Hemorrhagic lesion gastric antrum  Patient is status post gastric sleeve surgery  No active bleeding seen        ED Triage Vitals   Temperature Pulse Respirations Blood Pressure SpO2   04/17/23 0104 04/17/23 0105 04/17/23 0105 04/17/23 0105 04/17/23 0105   97 6 °F (36 4 °C) 73 17 (!) 194/119 99 %      Temp Source Heart Rate Source Patient Position - Orthostatic VS BP Location FiO2 (%)   04/17/23 0104 04/17/23 0105 04/17/23 0105 04/17/23 0105 --   Oral Monitor Lying Left arm       Pain Score       04/17/23 0110       9          Wt Readings from Last 1 Encounters:   04/17/23 84 8 kg (187 lb)     Additional Vital Signs:   04/18/23 0736 97 8 °F (36 6 °C) 61 20 134/94 -- 96 % -- -- Lying   04/18/23 0038 -- 66 20 140/95 -- -- -- -- Lying   04/17/23 1544 -- 57 12 143/69 -- 96 % -- None (Room air) --   04/17/23 1532 -- 62 19 137/70 -- 96 % -- None (Room air) --   04/17/23 1515 96 9 °F (36 1 °C) Abnormal  79 14 142/80 -- 90 % 6 L/min Simple mask --   04/17/23 1425 97 2 °F (36 2 °C) Abnormal  59 18 154/99 -- 98 % -- None (Room air) --   04/17/23 0823 97 4 °F (36 3 °C) Abnormal  61 18 136/90 -- 92 % -- -- Lying   04/17/23 0354 97 5 °F (36 4 °C) 67 17 150/84 114 97 % -- None (Room air) Lying   04/17/23 0258 -- 70 17 142/88 -- 99 % -- None (Room air) Lying   04/17/23 0110 -- -- -- 190/106 Abnormal  128 -- --       Pertinent Labs/Diagnostic Test Results:   CT abdomen pelvis with contrast   Final Result by Yahaira Dean MD (04/17 0302)      No evidence of acute intra-abdominal or pelvic pathology            Workstation performed: [id number]           Results from last 7 days   Lab Units 04/18/23  0438 04/17/23  1651 04/17/23  0901 04/17/23  0137   WBC Thousand/uL 3 52*  --  3 34* 4 17*   HEMOGLOBIN g/dL 11 1* 11 2* 11 0* 12 2   HEMATOCRIT % 34 9 33 6* 33 1* 37 0   PLATELETS Thousands/uL 154  --  154 182   NEUTROS ABS Thousands/µL 2 04  --  1 91 2 23     Results from last 7 days   Lab Units 04/18/23  0438 04/17/23  0901 04/17/23  0137   SODIUM mmol/L 139 139 140   POTASSIUM mmol/L 4 3 3 5 4 0   CHLORIDE mmol/L 105 107 106   CO2 mmol/L 28 24 26   ANION GAP mmol/L 6 8 8   BUN mg/dL 11 13 17   CREATININE mg/dL 0 63 0 54* 0 65   EGFR ml/min/1 73sq m 95 100 94   CALCIUM mg/dL 8 6 8 1* 8 7     Results from last 7 days   Lab Units 04/17/23  0137   AST U/L 17   ALT U/L 16 ALK PHOS U/L 64   TOTAL PROTEIN g/dL 6 6   ALBUMIN g/dL 4 0   TOTAL BILIRUBIN mg/dL 0 49   BILIRUBIN DIRECT mg/dL 0 12     Results from last 7 days   Lab Units 04/18/23  0438 04/17/23  0901 04/17/23  0137   GLUCOSE RANDOM mg/dL 85 89 91     Results from last 7 days   Lab Units 04/17/23  0137   PROTIME seconds 13 1   INR  0 96   PTT seconds 31     Results from last 7 days   Lab Units 04/18/23  0438   TSH 3RD GENERATON uIU/mL 3 431     Results from last 7 days   Lab Units 04/17/23  0137   LIPASE u/L 22       ED Treatment:   Medication Administration from 04/17/2023 0056 to 04/17/2023 0350       Date/Time Order Dose Route Action Comments     04/17/2023 0211 EDT acetaminophen (TYLENOL) tablet 975 mg 975 mg Oral Given --     04/17/2023 0305 EDT morphine injection 2 mg 2 mg Intravenous Given --        Past Medical History:   Diagnosis Date   • Aortic aneurysm (HCC)    • Arthritis     pelvic   • Chronic pain disorder     back  lower abd   • Colon polyp    • Diverticulitis    • GERD (gastroesophageal reflux disease)    • Hypertension      Present on Admission:  • Essential hypertension      Admitting Diagnosis: Rectal bleeding [K62 5]  LLQ abdominal pain [R10 32]  Age/Sex: 60 y o  female  Admission Orders:  Scheduled Medications:  hydrochlorothiazide, 25 mg, Oral, Daily  influenza vaccine, 0 5 mL, Intramuscular, Once  metoprolol succinate, 50 mg, Oral, Daily  pantoprazole, 40 mg, Intravenous, Q12H [facility]      Continuous IV Infusions:  lactated ringers, 75 mL/hr, Intravenous, Continuous      PRN Meds:  acetaminophen, 650 mg, Oral, Q6H PRN  oxyCODONE, 5 mg, Oral, Q4H PRN x 2 4/17  traZODone, 50 mg, Oral, HS PRN x 1 4/17      IP CONSULT TO GASTROENTEROLOGY    Network Utilization Review Department  ATTENTION: Please call with any questions or concerns to [phone] and carefully listen to the prompts so that you are directed to the right person   All voicemails are confidential   Geoff May all requests for admission clinical reviews, approved or denied determinations and any other requests to dedicated fax number below belonging to the campus where the patient is receiving treatment   List of dedicated fax numbers for the Facilities:  [address] [place] [address] DENIALS (Administrative/Medical Necessity) [phone]   [address] (Maternity/NICU/Pediatrics) [phone]   [address] Cher[address] Lovett [phone]   [facility] [phone]   [address] [address] [facility] [phone]   [address] [facility] 134 [address] [facility] [phone]

## 2023-04-18 NOTE — PROGRESS NOTES
"Progress Note - La Button 60 y o  female MRN: [id number]    Unit/Bed#: -01 Encounter: [id number]        Assessment/Plan:  GI bleed  Presents due passing large amounts of dark clots per rectum x 6  prior to admission  Associated generalized abdominal pain, worse in LUQ  Notes has been taking Naproxen over the past 4 days due to knee pain  -EGD was performed yesterday which showed hemorrhagic mucosa in the antrum with erosion, small hiatal hernia, status post gastric sleeve  • Hx of diverticulosis, GERD, hemorrhoids s/p recent band ligation 3/30 w/ Dr Beverly Andrade   • CT a/p wo acute abnormality   • Colonoscopy was performed in December 2021 which did show extensive diverticulosis throughout the left colon suggestive of resolving diverticulitis, no polyps  • Hemoglobin today remains stable around 11 since admission  • Diet advanced as tolerated   • continue PPI for 2 to 3 months  • Follow-up pathology  • Would recommend to add Carafate upon discharge along with Protonix and she can hold aspirin for the next week or so and that she has a follow-up with her family doctor and they can discuss this and she is only on this for preventative measures with no history of known CAD, CVA or thrombosis  • Follow-up hemoglobin as outpatient    Subjective:   Pt is lying in bed- states that she had bm today which was dark  She thinks this is old blood  Still complains of some burning pain in her abdomen      Objective:     Vitals: /94 (BP Location: Right arm)   Pulse 61   Temp 97 8 °F (36 6 °C) (Oral)   Resp 20   Ht 5' 3\" (1 6 m)   Wt 84 8 kg (187 lb)   SpO2 96%   BMI 33 13 kg/m²       Physical Exam:  Gen-alert, nad  Abd-soft, +bs, diffusely tender to palp, ND, no r r g       Lab, Imaging and other studies:   Recent Results (from the past 72 hour(s))   CBC and differential    Collection Time: 04/17/23  1:37 AM   Result Value Ref Range    WBC 4 17 (L) 4 31 - 10 16 Thousand/uL    RBC 4 37 3 81 - 5 12 Million/uL " Hemoglobin 12 2 11 5 - 15 4 g/dL    Hematocrit 37 0 34 8 - 46 1 %    MCV 85 82 - 98 fL    MCH 27 9 26 8 - 34 3 pg    MCHC 33 0 31 4 - 37 4 g/dL    RDW 13 2 11 6 - 15 1 %    MPV 9 9 8 9 - 12 7 fL    Platelets 188 945 - 799 Thousands/uL    nRBC 0 /100 WBCs    Neutrophils Relative 53 43 - 75 %    Immat GRANS % 1 0 - 2 %    Lymphocytes Relative 31 14 - 44 %    Monocytes Relative 11 4 - 12 %    Eosinophils Relative 3 0 - 6 %    Basophils Relative 1 0 - 1 %    Neutrophils Absolute 2 23 1 85 - 7 62 Thousands/µL    Immature Grans Absolute 0 03 0 00 - 0 20 Thousand/uL    Lymphocytes Absolute 1 31 0 60 - 4 47 Thousands/µL    Monocytes Absolute 0 44 0 17 - 1 22 Thousand/µL    Eosinophils Absolute 0 14 0 00 - 0 61 Thousand/µL    Basophils Absolute 0 02 0 00 - 0 10 Thousands/µL   Basic metabolic panel    Collection Time: 04/17/23  1:37 AM   Result Value Ref Range    Sodium 140 135 - 147 mmol/L    Potassium 4 0 3 5 - 5 3 mmol/L    Chloride 106 96 - 108 mmol/L    CO2 26 21 - 32 mmol/L    ANION GAP 8 4 - 13 mmol/L    BUN 17 5 - 25 mg/dL    Creatinine 0 65 0 60 - 1 30 mg/dL    Glucose 91 65 - 140 mg/dL    Calcium 8 7 8 4 - 10 2 mg/dL    eGFR 94 ml/min/1 73sq m   Type and screen    Collection Time: 04/17/23  1:37 AM   Result Value Ref Range    ABO Grouping O     Rh Factor Positive     Antibody Screen Negative     Specimen Expiration Date [id number]    Hepatic function panel    Collection Time: 04/17/23  1:37 AM   Result Value Ref Range    Total Bilirubin 0 49 0 20 - 1 00 mg/dL    Bilirubin, Direct 0 12 0 00 - 0 20 mg/dL    Alkaline Phosphatase 64 34 - 104 U/L    AST 17 13 - 39 U/L    ALT 16 7 - 52 U/L    Total Protein 6 6 6 4 - 8 4 g/dL    Albumin 4 0 3 5 - 5 0 g/dL   Lipase    Collection Time: 04/17/23  1:37 AM   Result Value Ref Range    Lipase 22 11 - 82 u/L   Protime-INR    Collection Time: 04/17/23  1:37 AM   Result Value Ref Range    Protime 13 1 11 6 - 14 5 seconds    INR 0 96 0 84 - 1 19   APTT    Collection Time: 04/17/23 1: 37 AM   Result Value Ref Range    PTT 31 23 - 37 seconds   Occult Bloood,Fecal Immunochemical    Collection Time: 04/17/23  1:38 AM   Result Value Ref Range    OCCULT BLD, FECAL IMMUNOLOGICAL Positive (A) Negative   CBC and differential    Collection Time: 04/17/23  9:01 AM   Result Value Ref Range    WBC 3 34 (L) 4 31 - 10 16 Thousand/uL    RBC 3 90 3 81 - 5 12 Million/uL    Hemoglobin 11 0 (L) 11 5 - 15 4 g/dL    Hematocrit 33 1 (L) 34 8 - 46 1 %    MCV 85 82 - 98 fL    MCH 28 2 26 8 - 34 3 pg    MCHC 33 2 31 4 - 37 4 g/dL    RDW 13 3 11 6 - 15 1 %    MPV 9 7 8 9 - 12 7 fL    Platelets 090 741 - 605 Thousands/uL    nRBC 0 /100 WBCs    Neutrophils Relative 56 43 - 75 %    Immat GRANS % 0 0 - 2 %    Lymphocytes Relative 28 14 - 44 %    Monocytes Relative 11 4 - 12 %    Eosinophils Relative 4 0 - 6 %    Basophils Relative 1 0 - 1 %    Neutrophils Absolute 1 91 1 85 - 7 62 Thousands/µL    Immature Grans Absolute 0 01 0 00 - 0 20 Thousand/uL    Lymphocytes Absolute 0 92 0 60 - 4 47 Thousands/µL    Monocytes Absolute 0 35 0 17 - 1 22 Thousand/µL    Eosinophils Absolute 0 12 0 00 - 0 61 Thousand/µL    Basophils Absolute 0 03 0 00 - 0 10 Thousands/µL   Basic metabolic panel    Collection Time: 04/17/23  9:01 AM   Result Value Ref Range    Sodium 139 135 - 147 mmol/L    Potassium 3 5 3 5 - 5 3 mmol/L    Chloride 107 96 - 108 mmol/L    CO2 24 21 - 32 mmol/L    ANION GAP 8 4 - 13 mmol/L    BUN 13 5 - 25 mg/dL    Creatinine 0 54 (L) 0 60 - 1 30 mg/dL    Glucose 89 65 - 140 mg/dL    Glucose, Fasting 89 65 - 99 mg/dL    Calcium 8 1 (L) 8 4 - 10 2 mg/dL    eGFR 100 ml/min/1 73sq m   Hemoglobin and hematocrit, blood    Collection Time: 04/17/23  4:51 PM   Result Value Ref Range    Hemoglobin 11 2 (L) 11 5 - 15 4 g/dL    Hematocrit 33 6 (L) 34 8 - 46 1 %   CBC and differential    Collection Time: 04/18/23  4:38 AM   Result Value Ref Range    WBC 3 52 (L) 4 31 - 10 16 Thousand/uL    RBC 3 96 3 81 - 5 12 Million/uL    Hemoglobin 11 1 (L) 11 5 - 15 4 g/dL    Hematocrit 34 9 34 8 - 46 1 %    MCV 88 82 - 98 fL    MCH 28 0 26 8 - 34 3 pg    MCHC 31 8 31 4 - 37 4 g/dL    RDW 13 1 11 6 - 15 1 %    MPV 9 6 8 9 - 12 7 fL    Platelets 691 303 - 237 Thousands/uL    nRBC 0 /100 WBCs    Neutrophils Relative 57 43 - 75 %    Immat GRANS % 1 0 - 2 %    Lymphocytes Relative 28 14 - 44 %    Monocytes Relative 9 4 - 12 %    Eosinophils Relative 4 0 - 6 %    Basophils Relative 1 0 - 1 %    Neutrophils Absolute 2 04 1 85 - 7 62 Thousands/µL    Immature Grans Absolute 0 02 0 00 - 0 20 Thousand/uL    Lymphocytes Absolute 0 97 0 60 - 4 47 Thousands/µL    Monocytes Absolute 0 33 0 17 - 1 22 Thousand/µL    Eosinophils Absolute 0 13 0 00 - 0 61 Thousand/µL    Basophils Absolute 0 03 0 00 - 0 10 Thousands/µL   Basic metabolic panel    Collection Time: 04/18/23  4:38 AM   Result Value Ref Range    Sodium 139 135 - 147 mmol/L    Potassium 4 3 3 5 - 5 3 mmol/L    Chloride 105 96 - 108 mmol/L    CO2 28 21 - 32 mmol/L    ANION GAP 6 4 - 13 mmol/L    BUN 11 5 - 25 mg/dL    Creatinine 0 63 0 60 - 1 30 mg/dL    Glucose 85 65 - 140 mg/dL    Calcium 8 6 8 4 - 10 2 mg/dL    eGFR 95 ml/min/1 73sq m   TSH, 3rd generation with Free T4 reflex    Collection Time: 04/18/23  4:38 AM   Result Value Ref Range    TSH 3RD GENERATON 3 431 0 450 - 4 500 uIU/mL

## 2023-04-18 NOTE — DISCHARGE SUMMARY
Akua U  65   Discharge- [facility] 1960, 60 y o  female MRN: [id number]  Unit/Bed#: -01 Encounter: [id number]  Primary Care Provider: Mt Nunez MD   Date and time admitted to hospital: 4/17/2023  1:03 AM    * GI bleed  Assessment & Plan  Presented after passing large amounts of blood clots per rectum on night of arrival  Reports last BM 4/15, denies dark stool, hematchezia, vomiting at that time  Associated generalized abdominal pain, worse in LLQ pain  Notes has been taking Naproxen over the past 4 days due to knee pain  · Hx of diverticulosis, GERD, hemorrhoids s/p recent rubber band ligation 3/30 w/ Dr Ramón Robles   · FOBT positive, CT a/p wo acute abnormality  · Hgb 12 2 on arrival, remained stable around 11 this admission  · EGD (4/17/23): Hemorrhagic lesion gastric antrum, no active bleeding seen    · GI following:  · Continue PPI for 2-3 months, add Carafate for symptom management  · Can continue holding ASA for next week, follow-up with PCP to reassess need for preventative tx  · Follow-up outpatient awaiting biopsy results  · Obtain CBC in 1 week from discharge    S/P gastric sleeve procedure  Assessment & Plan  · History of gastric sleeve procedure years ago  · Continue vitamin supplementation    Ascending aortic aneurysm (HCC)  Assessment & Plan  · Known history of ascending aortic aneurysm, monitored by CT surgery outpatient    Essential hypertension  Assessment & Plan  · BP reviewed, slightly above goal but overall stable   · Continue Toprol 50mg daily, HCTZ 25mg daily    Medical Problems     Resolved Problems  Date Reviewed: 4/18/2023   None       Discharging Physician / Practitioner: Davonte Holcomb PA-C  PCP: Mt Nunez MD  Admission Date:   Admission Orders (From admission, onward)     Ordered        04/17/23 0309  Place in Observation  Once                      Discharge Date: 04/18/23    Consultations During [facility]:  · Gastroenterology    Procedures Performed:   · EGD (4/17/23): Hemorrhagic lesion gastric antrum, no active bleeding seen  Significant Findings / Test Results:   · FOBT: Positive  Incidental Findings:   · None    Test Results Pending at Discharge (will require follow up):   · EGD biopsy results     Outpatient Tests Requested:  · Follow-up with GI outpatient, to review EGD biopsy results  · Follow-up with PCP in 1 week outpatient  · Obtain CBC in 1 week outpatient    Complications: None    Reason for Admission: GI bleed    Hospital Course:   Linda Nolen is a 60 y o  female patient, PMH GERD, diverticulosis, HTN, status post gastric sleeve procedure, who originally presented to the hospital on 4/17/2023 due to GI bleed, presented after passing large amounts of blood clots per rectum  On arrival to ED, FOBT positive but stable hemoglobin and vitals, CT A/P did not reveal any acute abnormalities  GI consulted, performed EGD on 4/17 which showed hemorrhagic gastric antrum lesion  Consider related to recent NSAID use, recommend avoiding NSAIDs and continuing with PPI, Carafate  Recommend following up with GI outpatient, PCP in 1 week to reassess resuming aspirin, obtain CBC in 1 week from discharge  Please see above list of diagnoses and related plan for additional information  Condition at Discharge: stable    Discharge Day Visit / Exam:   Subjective: Patient is seen at bedside this a m , reports mild abdominal distention/bloating and epigastric discomfort although no significant pain, nausea/vomiting, able to tolerate diet well  Reports having BM today, stool was firm/hard with some bright red blood but patient feels this is residual from the known bleed, as well as the first BM in a few days  Patient agrees with plan for discharge home today    Vitals: Blood Pressure: 134/94 (04/18/23 0736)  Pulse: 61 (04/18/23 0736)  Temperature: 97 8 °F (36 6 °C) (04/18/23 0736)  Temp Source: Oral "(04/18/23 0736)  Respirations: 20 (04/18/23 0736)  Height: 5' 3\" (160 cm) (04/17/23 1425)  Weight - Scale: 84 8 kg (187 lb) (04/17/23 1425)  SpO2: 96 % (04/18/23 0736)  Exam:   Physical Exam  Constitutional:       General: She is not in acute distress  Appearance: She is not ill-appearing, toxic-appearing or diaphoretic  Cardiovascular:      Rate and Rhythm: Normal rate and regular rhythm  Pulses: Normal pulses  Heart sounds: Normal heart sounds  Pulmonary:      Effort: Pulmonary effort is normal  No respiratory distress  Breath sounds: Normal breath sounds  Abdominal:      General: Bowel sounds are normal  There is no distension  Palpations: Abdomen is soft  Tenderness: There is abdominal tenderness  There is no guarding  Comments: Mild epigastric discomfort with palpation  Musculoskeletal:         General: No swelling or tenderness  Skin:     General: Skin is warm  Coloration: Skin is not jaundiced or pale  Neurological:      General: No focal deficit present  Mental Status: She is alert  Psychiatric:         Mood and Affect: Mood normal          Behavior: Behavior normal           Discussion with Family: Patient declined call to   Discharge instructions/Information to patient and family:   See after visit summary for information provided to patient and family  Provisions for Follow-Up Care:  See after visit summary for information related to follow-up care and any pertinent home health orders  Disposition:   Home    Planned Readmission: None     Discharge Statement:  I spent 60 minutes discharging the patient  This time was spent on the day of discharge  I had direct contact with the patient on the day of discharge  Greater than 50% of the total time was spent examining patient, answering all patient questions, arranging and discussing plan of care with patient as well as directly providing post-discharge instructions    " Additional time then spent on discharge activities  Discharge Medications:  See after visit summary for reconciled discharge medications provided to patient and/or family        **Please Note: This note may have been constructed using a voice recognition system**

## 2023-08-09 DIAGNOSIS — K92.2 GASTROINTESTINAL HEMORRHAGE, UNSPECIFIED GASTROINTESTINAL HEMORRHAGE TYPE: ICD-10-CM

## 2023-08-09 RX ORDER — SUCRALFATE 1 G/1
1 TABLET ORAL
Qty: 120 TABLET | Refills: 11 | Status: SHIPPED | OUTPATIENT
Start: 2023-08-09 | End: 2023-09-08

## 2023-08-09 RX ORDER — SUCRALFATE 1 G/1
1 TABLET ORAL
Qty: 120 TABLET | Refills: 0 | Status: SHIPPED | OUTPATIENT
Start: 2023-08-09 | End: 2023-08-09 | Stop reason: SDUPTHER

## 2023-08-09 RX ORDER — SUCRALFATE 1 G/1
1 TABLET ORAL 2 TIMES DAILY
Qty: 60 TABLET | Refills: 5 | Status: SHIPPED | OUTPATIENT
Start: 2023-08-09 | End: 2023-09-06 | Stop reason: SDUPTHER

## 2023-08-09 RX ORDER — PANTOPRAZOLE SODIUM 40 MG/1
40 TABLET, DELAYED RELEASE ORAL DAILY
Qty: 30 TABLET | Refills: 11 | Status: SHIPPED | OUTPATIENT
Start: 2023-08-09

## 2023-08-09 RX ORDER — PANTOPRAZOLE SODIUM 40 MG/1
40 TABLET, DELAYED RELEASE ORAL DAILY
Qty: 30 TABLET | Refills: 0 | Status: SHIPPED | OUTPATIENT
Start: 2023-08-09 | End: 2023-08-09 | Stop reason: SDUPTHER

## 2023-08-09 RX ORDER — PANTOPRAZOLE SODIUM 40 MG/1
40 TABLET, DELAYED RELEASE ORAL DAILY
Qty: 30 TABLET | Refills: 11 | Status: SHIPPED | OUTPATIENT
Start: 2023-08-09 | End: 2023-09-06 | Stop reason: SDUPTHER

## 2023-08-09 NOTE — TELEPHONE ENCOUNTER
Patients MyChart message states she is experiencing the same symptoms of previous ulcer episodes. She would like a refill on PPI and sucralfate.

## 2023-09-06 ENCOUNTER — OFFICE VISIT (OUTPATIENT)
Dept: GASTROENTEROLOGY | Facility: CLINIC | Age: 63
End: 2023-09-06
Payer: COMMERCIAL

## 2023-09-06 VITALS
SYSTOLIC BLOOD PRESSURE: 153 MMHG | HEIGHT: 63 IN | WEIGHT: 188 LBS | HEART RATE: 60 BPM | BODY MASS INDEX: 33.31 KG/M2 | DIASTOLIC BLOOD PRESSURE: 107 MMHG

## 2023-09-06 DIAGNOSIS — R14.0 BLOATING: ICD-10-CM

## 2023-09-06 DIAGNOSIS — K92.2 GASTROINTESTINAL HEMORRHAGE, UNSPECIFIED GASTROINTESTINAL HEMORRHAGE TYPE: ICD-10-CM

## 2023-09-06 DIAGNOSIS — R10.13 EPIGASTRIC PAIN: Primary | ICD-10-CM

## 2023-09-06 DIAGNOSIS — Z98.84 S/P LAPAROSCOPIC SLEEVE GASTRECTOMY: ICD-10-CM

## 2023-09-06 DIAGNOSIS — K29.01 ACUTE GASTRITIS WITH HEMORRHAGE, UNSPECIFIED GASTRITIS TYPE: ICD-10-CM

## 2023-09-06 PROCEDURE — 99214 OFFICE O/P EST MOD 30 MIN: CPT | Performed by: INTERNAL MEDICINE

## 2023-09-06 RX ORDER — SUCRALFATE 1 G/1
1 TABLET ORAL 2 TIMES DAILY
Qty: 60 TABLET | Refills: 5 | Status: SHIPPED | OUTPATIENT
Start: 2023-09-06

## 2023-09-06 RX ORDER — CHLORHEXIDINE GLUCONATE 4 %
12 LIQUID (ML) TOPICAL DAILY PRN
COMMUNITY

## 2023-09-06 RX ORDER — ASPIRIN 81 MG/1
TABLET ORAL
COMMUNITY

## 2023-09-06 RX ORDER — PANTOPRAZOLE SODIUM 40 MG/1
40 TABLET, DELAYED RELEASE ORAL DAILY
Qty: 30 TABLET | Refills: 11 | Status: SHIPPED | OUTPATIENT
Start: 2023-09-06 | End: 2023-10-06

## 2023-09-06 NOTE — PROGRESS NOTES
Answers for HPI/ROS submitted by the patient on 9/6/2023  Chronicity: recurrent  Onset: 1 to 4 weeks ago  Onset quality: gradual  Frequency: constantly  Episode duration: 7 Days  Progression since onset: waxing and waning  Pain location: LLQ, RUQ, epigastric region  Pain - numeric: 8/10  Pain quality: aching, burning, a sensation of fullness, sharp, tearing  Radiates to: LLQ, RLQ, back, left flank  anorexia: No  arthralgias: Yes  belching: Yes  constipation: No  diarrhea: No  dysuria: No  fever: No  flatus: Yes  frequency: No  headaches: No  hematochezia: Yes  hematuria: No  melena: No  myalgias: No  nausea: Yes  weight loss: No  vomiting: No  Aggravated by: bowel movement, certain positions  Relieved by: nothing  Diagnostic workup: CT scan    [facility] - Outpatient Follow-up Note  Tiffany Pounds 60 y.o. female MRN: [id number]  Encounter: [id number]          ASSESSMENT AND PLAN:      1. Epigastric pain  28-year-old female with history of sleeve gastrectomy, recent episode of GI bleed and had an endoscopy which shows evidence of hemorrhagic gastritis, treated with PPI twice a day along with the sucralfate now come for follow-up, patient is doing well, bleeding resolved but still having issue with abdominal pain and bloating. After sleeve gastrectomy she did not lose significant amount of weight, her symptoms could be related to gastroparesis after sleeve gastrectomy, will order gastric emptying study and then discuss about further treatment plan  - NM gastric emptying; Future    2. Bloating  Possible related to gastroparesis, will order gastric emptying study, advised to continue with low fiber and low-fat in the diet, continue with pantoprazole and sucralfate, refill was given, she did not start taking Wegovy yet    3. S/P laparoscopic sleeve gastrectomy  Post sleeve BMI is 33, she is planning to start [facility] which is still on the backorder.   I talked to her about Wegovy and the side effect including worsening of bloating and gastroparesis, after gastric emptying study we will discuss about further treatment plan  - NM gastric emptying; Future    4. Acute gastritis with hemorrhage, unspecified gastritis type  Refill for sucralfate 1 g 4 times a day was given, continue with Protonix    5. Gastrointestinal hemorrhage, unspecified gastrointestinal hemorrhage type    - pantoprazole (PROTONIX) 40 mg tablet; Take 1 tablet (40 mg total) by mouth daily  Dispense: 30 tablet; Refill: 11  - sucralfate (CARAFATE) 1 g tablet; Take 1 tablet (1 g total) by mouth 2 (two) times a day  Dispense: 60 tablet; Refill: 5    ______________________________________________________________________    SUBJECTIVE: Patient seen and examined, she come for follow-up, she had episode of GI bleed, recently hospitalized and upper endoscopy was done which shows evidence of hemorrhagic gastritis, she was treated with sucralfate and PPI, she used to take Naprosyn which I advised her to stop it, she still take Advil as needed. Long discussion with the patient about avoiding all the NSAID intake acetaminophen as needed, she is non-smoker, no chronic alcohol use, she is concerned about persistent upper abdominal pain, postprandial bloating, she denying any constipation, she denying any vomiting. She is here to discuss about treatment option for bloating, she was given prescription for [facility] which she did not start taking it, she is up-to-date with screening colonoscopy      REVIEW OF SYSTEMS IS OTHERWISE NEGATIVE.       Historical Information   Past Medical History:   Diagnosis Date   • Aortic aneurysm ([address])    • Arthritis     pelvic   • Chronic pain disorder     back  lower abd   • Colon polyp    • Diverticulitis    • GERD (gastroesophageal reflux disease)    • Hypertension      Past Surgical History:   Procedure Laterality Date   • ABDOMINAL SURGERY     • BREAST SURGERY      reduction   • CHOLECYSTECTOMY     • COLONOSCOPY     • EXCISION BLADDER MESH TRANSVESICLEPORT W/ LASER     • HYSTERECTOMY     • KNEE CARTILAGE SURGERY       Social History   Social History     Substance and Sexual Activity   Alcohol Use Yes   • Alcohol/week: 1.0 standard drink of alcohol   • Types: 1 Glasses of wine per week    Comment: 1 per day "5 beers per day"/ tequila     Social History     Substance and Sexual Activity   Drug Use Yes   • Types: Marijuana    Comment: medical marijuana card     Social History     Tobacco Use   Smoking Status Never   Smokeless Tobacco Never     Family History   Problem Relation Age of Onset   • Hypertension Mother    • Breast cancer Mother    • Hypertension Father        Meds/Allergies       Current Outpatient Medications:   •  Ascorbic Acid (VITAMIN C ER PO)  •  aspirin (ECOTRIN LOW STRENGTH) 81 mg EC tablet  •  cyanocobalamin (VITAMIN B-12) 1000 MCG tablet  •  hydrochlorothiazide (HYDRODIURIL) 25 mg tablet  •  Melatonin 12 MG TABS  •  metoprolol succinate (TOPROL-XL) 50 mg 24 hr tablet  •  Multiple Vitamins-Minerals (multivitamin with minerals) tablet  •  naproxen (NAPROSYN) 500 mg tablet  •  pantoprazole (PROTONIX) 40 mg tablet  •  pantoprazole (PROTONIX) 40 mg tablet  •  sucralfate (CARAFATE) 1 g tablet  •  sucralfate (CARAFATE) 1 g tablet  •  Semaglutide-Weight Management (WEGOVY) 0.25 MG/0.5ML    Allergies   Allergen Reactions   • Chlorhexidine Hives   • Latex Other (See Comments)     Skin irritation   • Lisinopril Cough           Objective     Blood pressure (!) 153/107, pulse 60, height 5' 3" (1.6 m), weight 85.3 kg (188 lb). Body mass index is 33.3 kg/m². PHYSICAL EXAM:      General Appearance:   Alert, cooperative, no distress   HEENT:   Normocephalic, atraumatic, anicteric.     Neck:  Supple, symmetrical, trachea midline   Lungs:   Clear to auscultation bilaterally; no rales, rhonchi or wheezing; respirations unlabored    Heart[de-identified]   Regular rate and rhythm; no murmur, rub, or gallop.    Abdomen:   Soft, non-tender, non-distended; normal bowel sounds; no masses, no organomegaly    Genitalia:   Deferred    Rectal:   Deferred    Extremities:  No cyanosis, clubbing or edema    Pulses:  2+ and symmetric    Skin:  No jaundice, rashes, or lesions    Lymph nodes:  No palpable cervical lymphadenopathy        Lab Results:   No visits with results within 1 Day(s) from this visit.    Latest known visit with results is:   Admission on 04/17/2023, Discharged on 04/18/2023   Component Date Value   • OCCULT BLD, FECAL IMMUNO* 04/17/2023 Positive (A)    • WBC 04/17/2023 4.17 (L)    • RBC 04/17/2023 4.37    • Hemoglobin 04/17/2023 12.2    • Hematocrit 04/17/2023 37.0    • MCV 04/17/2023 85    • MCH 04/17/2023 27.9    • MCHC 04/17/2023 33.0    • RDW 04/17/2023 13.2    • MPV 04/17/2023 9.9    • Platelets 90/98/2396 182    • nRBC 04/17/2023 0    • Neutrophils Relative 04/17/2023 53    • Immat GRANS % 04/17/2023 1    • Lymphocytes Relative 04/17/2023 31    • Monocytes Relative 04/17/2023 11    • Eosinophils Relative 04/17/2023 3    • Basophils Relative 04/17/2023 1    • Neutrophils Absolute 04/17/2023 2.23    • Immature Grans Absolute 04/17/2023 0.03    • Lymphocytes Absolute 04/17/2023 1.31    • Monocytes Absolute 04/17/2023 0.44    • Eosinophils Absolute 04/17/2023 0.14    • Basophils Absolute 04/17/2023 0.02    • Sodium 04/17/2023 140    • Potassium 04/17/2023 4.0    • Chloride 04/17/2023 106    • CO2 04/17/2023 26    • ANION GAP 04/17/2023 8    • BUN 04/17/2023 17    • Creatinine 04/17/2023 0.65    • Glucose 04/17/2023 91    • Calcium 04/17/2023 8.7    • eGFR 04/17/2023 94    • ABO Grouping 04/17/2023 O    • Rh Factor 04/17/2023 Positive    • Antibody Screen 04/17/2023 Negative    • Specimen Expiration Date 04/17/2023 [id number]    • Total Bilirubin 04/17/2023 0.49    • Bilirubin, Direct 04/17/2023 0.12    • Alkaline Phosphatase 04/17/2023 64    • AST 04/17/2023 17    • ALT 04/17/2023 16    • Total Protein 04/17/2023 6.6    • Albumin 04/17/2023 4.0    • Lipase 04/17/2023 22    • Protime 04/17/2023 13.1    • INR 04/17/2023 0.96    • PTT 04/17/2023 31    • WBC 04/17/2023 3.34 (L)    • RBC 04/17/2023 3.90    • Hemoglobin 04/17/2023 11.0 (L)    • Hematocrit 04/17/2023 33.1 (L)    • MCV 04/17/2023 85    • MCH 04/17/2023 28.2    • MCHC 04/17/2023 33.2    • RDW 04/17/2023 13.3    • MPV 04/17/2023 9.7    • Platelets 59/19/3635 154    • nRBC 04/17/2023 0    • Neutrophils Relative 04/17/2023 56    • Immat GRANS % 04/17/2023 0    • Lymphocytes Relative 04/17/2023 28    • Monocytes Relative 04/17/2023 11    • Eosinophils Relative 04/17/2023 4    • Basophils Relative 04/17/2023 1    • Neutrophils Absolute 04/17/2023 1.91    • Immature Grans Absolute 04/17/2023 0.01    • Lymphocytes Absolute 04/17/2023 0.92    • Monocytes Absolute 04/17/2023 0.35    • Eosinophils Absolute 04/17/2023 0.12    • Basophils Absolute 04/17/2023 0.03    • Sodium 04/17/2023 139    • Potassium 04/17/2023 3.5    • Chloride 04/17/2023 107    • CO2 04/17/2023 24    • ANION GAP 04/17/2023 8    • BUN 04/17/2023 13    • Creatinine 04/17/2023 0.54 (L)    • Glucose 04/17/2023 89    • Glucose, Fasting 04/17/2023 89    • Calcium 04/17/2023 8.1 (L)    • eGFR 04/17/2023 100    • Hemoglobin 04/17/2023 11.2 (L)    • Hematocrit 04/17/2023 33.6 (L)    • Case Report 04/17/2023                      Value:Surgical Pathology Report                         Case: [id number]                                   Authorizing Provider:  Nakita Delarosa MD           Collected:           04/17/2023 1508              Ordering Location:     [facility] Received:            04/17/2023 [id number]                                     3rd  Floor Med Surg Unit                                                     Pathologist:           Chance Pressley MD                                                       Specimen:    Stomach, gastric erosions bxx                                                             • Final Diagnosis 04/17/2023                      Value: This result contains rich text formatting which cannot be displayed here. • Note 04/17/2023                      Value: This result contains rich text formatting which cannot be displayed here. • Additional Information 04/17/2023                      Value: This result contains rich text formatting which cannot be displayed here. • Gross Description 04/17/2023                      Value: This result contains rich text formatting which cannot be displayed here. • WBC 04/18/2023 3.52 (L)    • RBC 04/18/2023 3.96    • Hemoglobin 04/18/2023 11.1 (L)    • Hematocrit 04/18/2023 34.9    • MCV 04/18/2023 88    • MCH 04/18/2023 28.0    • MCHC 04/18/2023 31.8    • RDW 04/18/2023 13.1    • MPV 04/18/2023 9.6    • Platelets 17/51/9763 154    • nRBC 04/18/2023 0    • Neutrophils Relative 04/18/2023 57    • Immat GRANS % 04/18/2023 1    • Lymphocytes Relative 04/18/2023 28    • Monocytes Relative 04/18/2023 9    • Eosinophils Relative 04/18/2023 4    • Basophils Relative 04/18/2023 1    • Neutrophils Absolute 04/18/2023 2.04    • Immature Grans Absolute 04/18/2023 0.02    • Lymphocytes Absolute 04/18/2023 0.97    • Monocytes Absolute 04/18/2023 0.33    • Eosinophils Absolute 04/18/2023 0.13    • Basophils Absolute 04/18/2023 0.03    • Sodium 04/18/2023 139    • Potassium 04/18/2023 4.3    • Chloride 04/18/2023 105    • CO2 04/18/2023 28    • ANION GAP 04/18/2023 6    • BUN 04/18/2023 11    • Creatinine 04/18/2023 0.63    • Glucose 04/18/2023 85    • Calcium 04/18/2023 8.6    • eGFR 04/18/2023 95    • TSH 3RD GENERATON 04/18/2023 3.431          Radiology Results:   No results found.

## 2023-10-09 ENCOUNTER — HOSPITAL ENCOUNTER (OUTPATIENT)
Dept: RADIOLOGY | Facility: HOSPITAL | Age: 63
Discharge: HOME | End: 2023-10-09
Attending: INTERNAL MEDICINE
Payer: COMMERCIAL

## 2023-10-09 DIAGNOSIS — Z98.84 S/P LAPAROSCOPIC SLEEVE GASTRECTOMY: ICD-10-CM

## 2023-10-09 DIAGNOSIS — R10.13 EPIGASTRIC PAIN: ICD-10-CM

## 2023-10-09 PROCEDURE — G1004 CDSM NDSC: HCPCS

## 2023-10-09 PROCEDURE — 78264 GASTRIC EMPTYING IMG STUDY: CPT

## 2023-10-09 PROCEDURE — A9541 TC99M SULFUR COLLOID: HCPCS

## 2023-11-03 ENCOUNTER — TELEPHONE (OUTPATIENT)
Dept: GASTROENTEROLOGY | Facility: CLINIC | Age: 63
End: 2023-11-03

## 2024-03-13 DIAGNOSIS — Z00.6 ENCOUNTER FOR EXAMINATION FOR NORMAL COMPARISON OR CONTROL IN CLINICAL RESEARCH PROGRAM: ICD-10-CM

## 2025-01-24 ENCOUNTER — OFFICE VISIT (OUTPATIENT)
Age: 65
End: 2025-01-24
Payer: COMMERCIAL

## 2025-01-24 VITALS
DIASTOLIC BLOOD PRESSURE: 96 MMHG | HEART RATE: 79 BPM | WEIGHT: 175 LBS | SYSTOLIC BLOOD PRESSURE: 139 MMHG | OXYGEN SATURATION: 98 % | HEIGHT: 64 IN | BODY MASS INDEX: 29.88 KG/M2

## 2025-01-24 DIAGNOSIS — K21.9 GASTROESOPHAGEAL REFLUX DISEASE WITHOUT ESOPHAGITIS: ICD-10-CM

## 2025-01-24 DIAGNOSIS — K64.9 HEMORRHOIDS, UNSPECIFIED HEMORRHOID TYPE: Primary | ICD-10-CM

## 2025-01-24 DIAGNOSIS — Z90.3 S/P GASTRIC SLEEVE PROCEDURE: ICD-10-CM

## 2025-01-24 DIAGNOSIS — K63.5 POLYP OF COLON, UNSPECIFIED PART OF COLON, UNSPECIFIED TYPE: ICD-10-CM

## 2025-01-24 DIAGNOSIS — K57.90 DIVERTICULOSIS: ICD-10-CM

## 2025-01-24 PROCEDURE — 99213 OFFICE O/P EST LOW 20 MIN: CPT | Performed by: INTERNAL MEDICINE

## 2025-01-24 RX ORDER — MINOXIDIL 2.5 MG/1
5 TABLET ORAL DAILY
COMMUNITY

## 2025-01-24 RX ORDER — HYDROCORTISONE 25 MG/G
CREAM TOPICAL 2 TIMES DAILY
Qty: 30 G | Refills: 2 | Status: SHIPPED | OUTPATIENT
Start: 2025-01-24

## 2025-01-24 NOTE — PROGRESS NOTES
"Name: Loretta Griffith      : 1960      MRN: [id number]  Encounter Provider: Alvin Carrera MD  Encounter Date: 2025   Encounter department: [facility]  :  Assessment & Plan  Hemorrhoids, unspecified hemorrhoid type  History of some hemorrhoidal symptoms, has had 3 banding done in the past.  Will advise patient takes Metamucil like fiber supplements to manage the bowels better.  Use local cream for symptomatic relief.  If symptoms persist then may need to either reconsider further banding and/or evaluation by colorectal surgeon for more definitive/surgical intervention.  Patient agrees.  Prefer conservative approach for now, also risk of incontinence for more aggressive hemorrhoidal intervention.  Orders:  •  hydrocortisone (ANUSOL-HC) 2.5 % rectal cream; Apply topically 2 (two) times a day    Gastroesophageal reflux disease without esophagitis         Diverticulosis         Polyp of colon, unspecified part of colon, unspecified type         S/P gastric sleeve procedure             History of Present Illness   HPI  Loretta Griffith is a 64 y.o. female who presents with history of gastric sleeve, had had some hemorrhoidal issues, had 3 rubber bands put in couple years ago.  Has noted some irritation in the area and something protruding when she is straining sometimes, rare blood.  Sometimes a little mucousy discharge.  Denies any painful defecation appetite is fair weight stable denies any chest pain shortness of breath fever chills rash.  Diet medications more than 10 systems reviewed.      Review of Systems       Objective   /96 (BP Location: Left arm, Patient Position: Sitting, Cuff Size: Standard)   Pulse 79   Ht 5' 3.5\" (1.613 m)   Wt 79.4 kg (175 lb)   SpO2 98%   BMI 30.51 kg/m²      Physical Exam  Constitutional:       General: She is not in acute distress.     Appearance: She is normal weight. She is not ill-appearing.   HENT:      Mouth/Throat:      " Mouth: Mucous membranes are moist.   Cardiovascular:      Rate and Rhythm: Normal rate.   Pulmonary:      Effort: No respiratory distress.      Breath sounds: No stridor. No wheezing or rales.   Abdominal:      General: Bowel sounds are normal. There is no distension.      Palpations: There is no mass.      Tenderness: There is no abdominal tenderness. There is no guarding or rebound.      Hernia: No hernia is present.   Skin:     Coloration: Skin is not jaundiced or pale.   Neurological:      Mental Status: She is oriented to person, place, and time.

## 2025-06-16 ENCOUNTER — HOSPITAL ENCOUNTER (EMERGENCY)
Facility: HOSPITAL | Age: 65
Discharge: HOME | End: 2025-06-16
Attending: INTERNAL MEDICINE
Payer: MEDICARE

## 2025-06-16 VITALS
TEMPERATURE: 97.4 F | OXYGEN SATURATION: 100 % | RESPIRATION RATE: 16 BRPM | SYSTOLIC BLOOD PRESSURE: 145 MMHG | DIASTOLIC BLOOD PRESSURE: 86 MMHG | HEART RATE: 66 BPM

## 2025-06-16 DIAGNOSIS — R53.83 FATIGUE: Primary | ICD-10-CM

## 2025-06-16 DIAGNOSIS — R11.0 NAUSEA: ICD-10-CM

## 2025-06-16 LAB
ANION GAP SERPL CALCULATED.3IONS-SCNC: 8 MMOL/L (ref 4–13)
BASOPHILS # BLD AUTO: 0.02 THOUSANDS/ÂΜL (ref 0–0.1)
BASOPHILS NFR BLD AUTO: 0 % (ref 0–1)
BUN SERPL-MCNC: 12 MG/DL (ref 5–25)
CALCIUM SERPL-MCNC: 8.8 MG/DL (ref 8.4–10.2)
CHLORIDE SERPL-SCNC: 103 MMOL/L (ref 96–108)
CO2 SERPL-SCNC: 27 MMOL/L (ref 21–32)
CREAT SERPL-MCNC: 0.68 MG/DL (ref 0.6–1.3)
EOSINOPHIL # BLD AUTO: 0.07 THOUSAND/ÂΜL (ref 0–0.61)
EOSINOPHIL NFR BLD AUTO: 1 % (ref 0–6)
ERYTHROCYTE [DISTWIDTH] IN BLOOD BY AUTOMATED COUNT: 12.8 % (ref 11.6–15.1)
GFR SERPL CREATININE-BSD FRML MDRD: 92 ML/MIN/1.73SQ M
GLUCOSE SERPL-MCNC: 103 MG/DL (ref 65–140)
HCT VFR BLD AUTO: 38.7 % (ref 34.8–46.1)
HGB BLD-MCNC: 13 G/DL (ref 11.5–15.4)
IMM GRANULOCYTES # BLD AUTO: 0.02 THOUSAND/UL (ref 0–0.2)
IMM GRANULOCYTES NFR BLD AUTO: 0 % (ref 0–2)
LYMPHOCYTES # BLD AUTO: 1.21 THOUSANDS/ÂΜL (ref 0.6–4.47)
LYMPHOCYTES NFR BLD AUTO: 23 % (ref 14–44)
MCH RBC QN AUTO: 28.8 PG (ref 26.8–34.3)
MCHC RBC AUTO-ENTMCNC: 33.6 G/DL (ref 31.4–37.4)
MCV RBC AUTO: 86 FL (ref 82–98)
MONOCYTES # BLD AUTO: 0.43 THOUSAND/ÂΜL (ref 0.17–1.22)
MONOCYTES NFR BLD AUTO: 8 % (ref 4–12)
NEUTROPHILS # BLD AUTO: 3.45 THOUSANDS/ÂΜL (ref 1.85–7.62)
NEUTS SEG NFR BLD AUTO: 68 % (ref 43–75)
NRBC BLD AUTO-RTO: 0 /100 WBCS
PLATELET # BLD AUTO: 184 THOUSANDS/UL (ref 149–390)
PMV BLD AUTO: 10 FL (ref 8.9–12.7)
POTASSIUM SERPL-SCNC: 3.5 MMOL/L (ref 3.5–5.3)
RBC # BLD AUTO: 4.52 MILLION/UL (ref 3.81–5.12)
SODIUM SERPL-SCNC: 138 MMOL/L (ref 135–147)
WBC # BLD AUTO: 5.2 THOUSAND/UL (ref 4.31–10.16)

## 2025-06-16 PROCEDURE — 80048 BASIC METABOLIC PNL TOTAL CA: CPT | Performed by: PHYSICIAN ASSISTANT

## 2025-06-16 PROCEDURE — 99283 EMERGENCY DEPT VISIT LOW MDM: CPT

## 2025-06-16 PROCEDURE — 99284 EMERGENCY DEPT VISIT MOD MDM: CPT | Performed by: PHYSICIAN ASSISTANT

## 2025-06-16 PROCEDURE — 36415 COLL VENOUS BLD VENIPUNCTURE: CPT | Performed by: PHYSICIAN ASSISTANT

## 2025-06-16 PROCEDURE — 85025 COMPLETE CBC W/AUTO DIFF WBC: CPT | Performed by: PHYSICIAN ASSISTANT

## 2025-06-16 PROCEDURE — 96361 HYDRATE IV INFUSION ADD-ON: CPT

## 2025-06-16 PROCEDURE — 96374 THER/PROPH/DIAG INJ IV PUSH: CPT

## 2025-06-16 RX ORDER — NALTREXONE HYDROCHLORIDE 50 MG/1
50 TABLET, FILM COATED ORAL DAILY
COMMUNITY

## 2025-06-16 RX ORDER — ONDANSETRON 2 MG/ML
4 INJECTION INTRAMUSCULAR; INTRAVENOUS ONCE
Status: COMPLETED | OUTPATIENT
Start: 2025-06-16 | End: 2025-06-16

## 2025-06-16 RX ADMIN — ONDANSETRON 4 MG: 2 INJECTION INTRAMUSCULAR; INTRAVENOUS at 13:35

## 2025-06-16 RX ADMIN — SODIUM CHLORIDE 1000 ML: 0.9 INJECTION, SOLUTION INTRAVENOUS at 13:35

## 2025-06-16 NOTE — ED PROVIDER NOTES
"Time reflects when diagnosis was documented in both MDM as applicable and the Disposition within this note       Time User Action Codes Description Comment    6/16/2025  3:20 PM vEe Scott Add [T88.7XXA] Medication side effect     6/16/2025  3:20 PM Eve Scott Remove [T88.7XXA] Medication side effect     6/16/2025  3:20 PM Eve Scott Add [R53.83] Fatigue     6/16/2025  3:20 PM Eve Scott Add [R11.0] Nausea     6/16/2025  3:21 PM Eve Scott Modify [R11.0] Nausea medication side effect    6/16/2025  3:21 PM Eve Scott Modify [R53.83] Fatigue medication side effect          ED Disposition       ED Disposition   Discharge    Condition   Stable    Date/Time   Mon Jun 16, 2025  3:19 PM    Comment   Loretta Griffith discharge to home/self care.                   Assessment & Plan       Medical Decision Making  Pt with tingling to lips, fatigue, nausea without vomiting, photophobia/ no HA since she took 1/4 pill (about 12.5mg) Naltrexone 50mg tablet earlier today  DDX: vertigo, migraines, allergic reaction, medication side effects, anxiety, electrolyte abnormality, among others  Will check basic labs, give IV fluids, Zofran for nausea.  Patient with normal vital signs except slightly elevated blood pressure, no focal neurological deficits  1520, feeling better, ready for dc    Amount and/or Complexity of Data Reviewed  Labs: ordered. Decision-making details documented in ED Course.    Risk  Prescription drug management.        ED Course as of 06/16/25 1555   Mon Jun 16, 2025   1356 CBC unremarkable   1424 BMP unremarkable   1442 Checked on patient, she states she's feeling better, nausea improved, but still \"feels foggy in her head\", would like to finish the IVF and rest another 30 minutes         Medications   sodium chloride 0.9 % bolus 1,000 mL (0 mL Intravenous Stopped 6/16/25 1519)   ondansetron (ZOFRAN) injection 4 mg (4 mg Intravenous Given 6/16/25 1335)       ED Risk Strat Scores    " "                No data recorded                            History of Present Illness       Chief Complaint   Patient presents with    Allergic Reaction     Pt started a new prescription for naltrexone- second dose taken at 11:30a. Pt c/o numb lips, nausea, \"feels out of it\"       Past Medical History[1]   Past Surgical History[2]   Family History[3]   Social History[4]   E-Cigarette/Vaping    E-Cigarette Use Never User       E-Cigarette/Vaping Substances    Nicotine No     THC No     CBD No     Flavoring No       I have reviewed and agree with the history as documented.     PMH: HTN, Vertigo, Migraine HA, Ascending aortic aneurysm, TIA, Diverticulosis, GERD, Arthritis, Chronic pain disorder, GI bleed  PSH: S/P gastric sleeve procedure, Hysterectomy, Cholecystectomy, Breast surgery, EXCISION BLADDER MESH TRANSVESICLEPORT W/ LASER, Knee cartilage surgery    Pt presents to ED c/o tingling to lips, fatigue, nausea without vomiting, photophobia/ no HA since she took 1/4 pill (about 12.5mg) Naltrexone 50mg tablet earlier today  Pt states was started on this medication last week to help with weight loss, took 50mg dose last week had similar sx for days, so didn't take any more until today  Pt denies HA, fever, recent URI sx, no cp, sob, vomiting, bowel changes, no focal weakness  Pt with h/o vertigo but states does not feel like this.  Pt with h/o migraines but states does not have a headache with this        Review of Systems   Constitutional:  Positive for fatigue. Negative for fever.   HENT:  Negative for congestion, rhinorrhea and sore throat.    Eyes:  Positive for photophobia. Negative for pain and visual disturbance.   Respiratory:  Negative for cough and shortness of breath.    Cardiovascular:  Negative for chest pain and leg swelling.   Gastrointestinal:  Positive for nausea. Negative for abdominal pain, diarrhea and vomiting.   Genitourinary:  Negative for dysuria and frequency.   Musculoskeletal:  Negative for " arthralgias and myalgias.   Skin:  Negative for rash.   Neurological:  Positive for weakness and light-headedness. Negative for dizziness, speech difficulty and headaches.   Psychiatric/Behavioral:  Negative for behavioral problems.    All other systems reviewed and are negative.          Objective       ED Triage Vitals   Temperature Pulse Blood Pressure Respirations SpO2 Patient Position - Orthostatic VS   06/16/25 1300 06/16/25 1256 06/16/25 1256 06/16/25 1256 06/16/25 1256 06/16/25 1256   (!) 97.4 °F (36.3 °C) 58 161/96 16 100 % Lying      Temp Source Heart Rate Source BP Location FiO2 (%) Pain Score    06/16/25 1300 06/16/25 1256 06/16/25 1256 -- 06/16/25 1345    Oral Monitor Left arm  No Pain      Vitals      Date and Time Temp Pulse SpO2 Resp BP Pain Score FACES Pain Rating User   06/16/25 1520 -- 66 100 % 16 145/86 No Pain -- RR   06/16/25 1345 -- 49 98 % -- 134/87 No Pain -- RR   06/16/25 1300 97.4 °F (36.3 °C) -- -- -- -- -- -- RR   06/16/25 1256 -- 58 100 % 16 161/96 -- -- TM            Physical Exam  Vitals and nursing note reviewed.   Constitutional:       General: She is in acute distress (mild).      Appearance: Normal appearance. She is well-developed. She is not ill-appearing.      Comments: + photophobia (wearing a eye sleep mask)   HENT:      Head: Normocephalic and atraumatic.      Right Ear: External ear normal.      Left Ear: External ear normal.      Nose: Nose normal.      Mouth/Throat:      Mouth: Mucous membranes are moist.      Pharynx: Oropharynx is clear.     Eyes:      General:         Right eye: No discharge.         Left eye: No discharge.      Extraocular Movements: Extraocular movements intact.      Conjunctiva/sclera: Conjunctivae normal.      Pupils: Pupils are equal, round, and reactive to light.       Cardiovascular:      Rate and Rhythm: Normal rate and regular rhythm.   Pulmonary:      Effort: Pulmonary effort is normal. No respiratory distress.      Breath sounds: Normal  breath sounds.   Abdominal:      General: Bowel sounds are normal.      Palpations: Abdomen is soft.      Tenderness: There is no abdominal tenderness.     Musculoskeletal:         General: Normal range of motion.      Cervical back: Normal range of motion and neck supple. No rigidity or tenderness.      Right lower leg: No edema.      Left lower leg: No edema.     Skin:     General: Skin is warm and dry.     Neurological:      General: No focal deficit present.      Mental Status: She is alert and oriented to person, place, and time. Mental status is at baseline.      Cranial Nerves: Cranial nerves 2-12 are intact. No cranial nerve deficit, dysarthria or facial asymmetry.      Sensory: Sensation is intact.      Motor: Motor function is intact. No weakness, abnormal muscle tone or pronator drift.      Coordination: Coordination is intact. Romberg sign negative. Coordination normal.     Psychiatric:         Mood and Affect: Mood normal.         Behavior: Behavior normal.         Results Reviewed       Procedure Component Value Units Date/Time    Basic metabolic panel [[id number]] Collected: 06/16/25 1339    Lab Status: Final result Specimen: Blood from Arm, Right Updated: 06/16/25 1406     Sodium 138 mmol/L      Potassium 3.5 mmol/L      Chloride 103 mmol/L      CO2 27 mmol/L      ANION GAP 8 mmol/L      BUN 12 mg/dL      Creatinine 0.68 mg/dL      Glucose 103 mg/dL      Calcium 8.8 mg/dL      eGFR 92 ml/min/1.73sq m     Narrative:      [employer] guidelines for Chronic Kidney Disease (CKD):     Stage 1 with normal or high GFR (GFR > 90 mL/min/1.73 square meters)    Stage 2 Mild CKD (GFR = 60-89 mL/min/1.73 square meters)    Stage 3A Moderate CKD (GFR = 45-59 mL/min/1.73 square meters)    Stage 3B Moderate CKD (GFR = 30-44 mL/min/1.73 square meters)    Stage 4 Severe CKD (GFR = 15-29 mL/min/1.73 square meters)    Stage 5 End Stage CKD (GFR <15 mL/min/1.73 square meters)  Note: GFR calculation  is accurate only with a steady state creatinine    CBC and differential [[id number]] Collected: 06/16/25 1339    Lab Status: Final result Specimen: Blood from Arm, Right Updated: 06/16/25 1348     WBC 5.20 Thousand/uL      RBC 4.52 Million/uL      Hemoglobin 13.0 g/dL      Hematocrit 38.7 %      MCV 86 fL      MCH 28.8 pg      MCHC 33.6 g/dL      RDW 12.8 %      MPV 10.0 fL      Platelets 184 Thousands/uL      nRBC 0 /100 WBCs      Segmented % 68 %      Immature Grans % 0 %      Lymphocytes % 23 %      Monocytes % 8 %      Eosinophils Relative 1 %      Basophils Relative 0 %      Absolute Neutrophils 3.45 Thousands/µL      Absolute Immature Grans 0.02 Thousand/uL      Absolute Lymphocytes 1.21 Thousands/µL      Absolute Monocytes 0.43 Thousand/µL      Eosinophils Absolute 0.07 Thousand/µL      Basophils Absolute 0.02 Thousands/µL             No orders to display       Procedures    ED Medication and Procedure Management   Prior to Admission Medications   Prescriptions Last Dose Informant Patient Reported? Taking?   Melatonin 12 MG TABS  Self Yes No   Sig: Take 12 mg by mouth daily as needed   hydrochlorothiazide (HYDRODIURIL) 25 mg tablet  Self Yes No   Sig: Take 25 mg by mouth daily   metoprolol succinate (TOPROL-XL) 50 mg 24 hr tablet  Self Yes No   Sig: Take 50 mg by mouth daily   naltrexone (REVIA) 50 mg tablet   Yes No   Sig: Take 50 mg by mouth daily      Facility-Administered Medications: None     Discharge Medication List as of 6/16/2025  3:22 PM        CONTINUE these medications which have NOT CHANGED    Details   hydrochlorothiazide (HYDRODIURIL) 25 mg tablet Take 25 mg by mouth daily, Starting Fri 11/12/2021, Until Fri 1/24/2025, Historical Med      Melatonin 12 MG TABS Take 12 mg by mouth daily as needed, Historical Med      metoprolol succinate (TOPROL-XL) 50 mg 24 hr tablet Take 50 mg by mouth daily, Historical Med      naltrexone (REVIA) 50 mg tablet Take 50 mg by mouth daily, Historical Med        "    No discharge procedures on file.  ED SEPSIS DOCUMENTATION   Time reflects when diagnosis was documented in both MDM as applicable and the Disposition within this note       Time User Action Codes Description Comment    6/16/2025  3:20 PM Eve Scott Add [T88.7XXA] Medication side effect     6/16/2025  3:20 PM Eve Scott Remove [T88.7XXA] Medication side effect     6/16/2025  3:20 PM Eve Scott Add [R53.83] Fatigue     6/16/2025  3:20 PM Eve Scott Add [R11.0] Nausea     6/16/2025  3:21 PM Eve Scott Modify [R11.0] Nausea medication side effect    6/16/2025  3:21 PM Eve Scott Modify [R53.83] Fatigue medication side effect                   [1]   Past Medical History:  Diagnosis Date    Aortic aneurysm (HCC)     Arthritis     pelvic    Chronic pain disorder     back  lower abd    Colon polyp     Diverticulitis     GERD (gastroesophageal reflux disease)     Hypertension    [2]   Past Surgical History:  Procedure Laterality Date    ABDOMINAL SURGERY      BREAST SURGERY      reduction    CHOLECYSTECTOMY      COLONOSCOPY      EXCISION BLADDER MESH TRANSVESICLEPORT W/ LASER      HYSTERECTOMY      KNEE CARTILAGE SURGERY     [3]   Family History  Problem Relation Name Age of Onset    Hypertension Mother      Breast cancer Mother      Hypertension Father     [4]   Social History  Tobacco Use    Smoking status: Never    Smokeless tobacco: Never   Vaping Use    Vaping status: Never Used   Substance Use Topics    Alcohol use: Yes     Alcohol/week: 1.0 standard drink of alcohol     Types: 1 Glasses of wine per week     Comment: 1 per day \"5 beers per day\"/ tequila    Drug use: Yes     Types: Marijuana     Comment: medical marijuana card        Eve Scott PA-C  06/16/25 9525    "